# Patient Record
Sex: MALE | Race: BLACK OR AFRICAN AMERICAN | Employment: OTHER | URBAN - METROPOLITAN AREA
[De-identification: names, ages, dates, MRNs, and addresses within clinical notes are randomized per-mention and may not be internally consistent; named-entity substitution may affect disease eponyms.]

---

## 2017-10-20 ASSESSMENT — PROMIS GLOBAL HEALTH SCALE
IN GENERAL, HOW WOULD YOU RATE YOUR MENTAL HEALTH, INCLUDING YOUR MOOD AND YOUR ABILITY TO THINK [ON A SCALE OF 1 (POOR) TO 5 (EXCELLENT)]?: 2
HOW IS THE PROMIS V1.1 BEING ADMINISTERED?: 0
IN GENERAL, HOW WOULD YOU RATE YOUR SATISFACTION WITH YOUR SOCIAL ACTIVITIES AND RELATIONSHIPS [ON A SCALE OF 1 (POOR) TO 5 (EXCELLENT)]?: 4
IN GENERAL, WOULD YOU SAY YOUR HEALTH IS...[ON A SCALE OF 1 (POOR) TO 5 (EXCELLENT)]: 2
TO WHAT EXTENT ARE YOU ABLE TO CARRY OUT YOUR EVERYDAY PHYSICAL ACTIVITIES SUCH AS WALKING, CLIMBING STAIRS, CARRYING GROCERIES, OR MOVING A CHAIR [ON A SCALE OF 1 (NOT AT ALL) TO 5 (COMPLETELY)]?: 3
SUM OF RESPONSES TO QUESTIONS 3, 6, 7, & 8: 16
IN GENERAL, PLEASE RATE HOW WELL YOU CARRY OUT YOUR USUAL SOCIAL ACTIVITIES (INCLUDES ACTIVITIES AT HOME, AT WORK, AND IN YOUR COMMUNITY, AND RESPONSIBILITIES AS A PARENT, CHILD, SPOUSE, EMPLOYEE, FRIEND, ETC) [ON A SCALE OF 1 (POOR) TO 5 (EXCELLENT)]?: 3
SUM OF RESPONSES TO QUESTIONS 2, 4, 5, & 10: 12
WHO IS THE PERSON COMPLETING THE PROMIS V1.1 SURVEY?: 0
IN THE PAST 7 DAYS, HOW WOULD YOU RATE YOUR FATIGUE ON AVERAGE [ON A SCALE FROM 1 (NONE) TO 5 (VERY SEVERE)]?: 3
IN THE PAST 7 DAYS, HOW OFTEN HAVE YOU BEEN BOTHERED BY EMOTIONAL PROBLEMS, SUCH AS FEELING ANXIOUS, DEPRESSED, OR IRRITABLE [ON A SCALE FROM 1 (NEVER) TO 5 (ALWAYS)]?: 3
IN GENERAL, WOULD YOU SAY YOUR QUALITY OF LIFE IS...[ON A SCALE OF 1 (POOR) TO 5 (EXCELLENT)]: 3
IN THE PAST 7 DAYS, HOW WOULD YOU RATE YOUR PAIN ON AVERAGE [ON A SCALE FROM 0 (NO PAIN) TO 10 (WORST IMAGINABLE PAIN)]?: 8
IN GENERAL, HOW WOULD YOU RATE YOUR PHYSICAL HEALTH [ON A SCALE OF 1 (POOR) TO 5 (EXCELLENT)]?: 2

## 2017-10-20 ASSESSMENT — HOOS JR
HOOS JR RAW SCORE: 11
GOING UP OR DOWN STAIRS: 3
RISING FROM SITTING: 2
BENDING TO THE FLOOR TO PICK UP OBJECT: 2
SITTING: 1
LYING IN BED (TURNING OVER, MAINTAINING HIP POSITION): 1
WALKING ON UNEVEN SURFACE: 2

## 2017-10-24 NOTE — CARE COORDINATION
[facility] Pre-surgical Assessment    Assessment done:  Over the phone with Patient  Patient sounds:  alert and oriented X3  Patient abilities:  Able to answer all questions appropriately. Patient DC Plans:  Unsure currently - unable to decide - maybe. [facility] Home with Home Health Care     LABS:       Obtained on: 10/25/17  Hgb     14.3  Hct    42.9  WBC    4.6  Creatinine   0.73  BUN    11  Glucose   99  BMI    26.6    Patient Name:            Davide Servin is a 67 y.o. male     Patient Address:       [address] [place] [address], [address] [place] [address]  Patient Phone:          [phone]  Emergency Contact:  Suhail Russell Mail [phone])    Date of Surgery:  11/7/17 at 7:30 am  Procedure:   LTHA  Surgeon:   [address] Name:   Suhail Russell Mail [phone])  Payor:    [place] Bundle? No  PCP:    Digna Harry MD  Last time seen PCP:  [id number] [facility] Name/Location:  [address]    Living Situation:   Living arrangements:  with family:  spouse       Home:     single family home   Steps:     yes   # of steps to enter home  2   Bedroom Location:  1st floor   Bathroom Location:  1st floor   Social support:  a good social support network   Concerns re: home safety? no    How far can you walk now approximately? 200'  Do you use a cane, walker or other device to help you walk? Yes - \"I use an old cane\"     Risk Analysis:  Surgery Risks/Chronics:  HTN, HLPD, DM2, [place], Hearing Aids, Osteoarthritis  Safety Risks:    Visual Impairment, [place], Bilateral Hearing Aids, Difficulty Ambulating Post Surgery  Pre-Op Clearance received? Appointment later this week  PAT scheduled/completed? Yes tomorrow 10/25/17  Pre-optimization needed? Self-Care, Fall Risk & DME:   Previous falls/injuries in the home?  No   Visual Impairement:  Glasses   Difficulty hearing: Hearing Aid - Left & Right   Smoking: The patient denies any lifetime nicotine/tobacco

## 2017-10-25 ENCOUNTER — HOSPITAL ENCOUNTER (OUTPATIENT)
Dept: PREADMISSION TESTING | Age: 72
Discharge: HOME | End: 2017-10-25
Payer: MEDICARE

## 2017-10-25 ENCOUNTER — HOSPITAL ENCOUNTER (OUTPATIENT)
Dept: GENERAL RADIOLOGY | Age: 72
Discharge: HOME | End: 2017-10-25
Payer: MEDICARE

## 2017-10-25 VITALS
DIASTOLIC BLOOD PRESSURE: 66 MMHG | RESPIRATION RATE: 18 BRPM | WEIGHT: 200.84 LBS | SYSTOLIC BLOOD PRESSURE: 132 MMHG | OXYGEN SATURATION: 99 % | HEIGHT: 73 IN | HEART RATE: 57 BPM | BODY MASS INDEX: 26.62 KG/M2

## 2017-10-25 LAB
ABO/RH: NORMAL
ABSOLUTE EOS #: 0.1 K/UL (ref 0–0.4)
ABSOLUTE IMMATURE GRANULOCYTE: NORMAL K/UL (ref 0–0.3)
ABSOLUTE LYMPH #: 1.2 K/UL (ref 1–4.8)
ABSOLUTE MONO #: 0.3 K/UL (ref 0.2–0.8)
ANION GAP SERPL CALCULATED.3IONS-SCNC: 11 MMOL/L (ref 9–17)
ANTIBODY SCREEN: NEGATIVE
ARM BAND NUMBER: NORMAL
BASOPHILS # BLD: 3 %
BASOPHILS ABSOLUTE: 0.1 K/UL (ref 0–0.2)
BILIRUBIN URINE: NEGATIVE
BUN BLDV-MCNC: 11 MG/DL (ref 8–23)
BUN/CREAT BLD: 15 (ref 9–20)
CALCIUM SERPL-MCNC: 9.7 MG/DL (ref 8.6–10.4)
CHLORIDE BLD-SCNC: 101 MMOL/L (ref 98–107)
CO2: 29 MMOL/L (ref 20–31)
COLOR: YELLOW
COMMENT UA: NORMAL
CREAT SERPL-MCNC: 0.73 MG/DL (ref 0.7–1.2)
DIFFERENTIAL TYPE: NORMAL
EOSINOPHILS RELATIVE PERCENT: 3 %
EXPIRATION DATE: NORMAL
GFR AFRICAN AMERICAN: >60 ML/MIN
GFR NON-AFRICAN AMERICAN: >60 ML/MIN
GFR SERPL CREATININE-BSD FRML MDRD: NORMAL ML/MIN/{1.73_M2}
GFR SERPL CREATININE-BSD FRML MDRD: NORMAL ML/MIN/{1.73_M2}
GLUCOSE BLD-MCNC: 99 MG/DL (ref 70–99)
GLUCOSE URINE: NEGATIVE
HCT VFR BLD CALC: 42.9 % (ref 41–53)
HEMOGLOBIN: 14.3 G/DL (ref 13.5–17.5)
IMMATURE GRANULOCYTES: NORMAL %
KETONES, URINE: NEGATIVE
LEUKOCYTE ESTERASE, URINE: NEGATIVE
LYMPHOCYTES # BLD: 26 %
MCH RBC QN AUTO: 29.9 PG (ref 26–34)
MCHC RBC AUTO-ENTMCNC: 33.4 G/DL (ref 31–37)
MCV RBC AUTO: 89.7 FL (ref 80–100)
MONOCYTES # BLD: 7 %
MRSA, DNA, NASAL: NORMAL
NITRITE, URINE: NEGATIVE
PDW BLD-RTO: 12.5 % (ref 11.5–14.5)
PH UA: 6.5 (ref 5–8)
PLATELET # BLD: 298 K/UL (ref 130–400)
PLATELET ESTIMATE: NORMAL
PMV BLD AUTO: NORMAL FL (ref 6–12)
POTASSIUM SERPL-SCNC: 3.8 MMOL/L (ref 3.7–5.3)
PROTEIN UA: NEGATIVE
RBC # BLD: 4.79 M/UL (ref 4.5–5.9)
RBC # BLD: NORMAL 10*6/UL
SEG NEUTROPHILS: 61 %
SEGMENTED NEUTROPHILS ABSOLUTE COUNT: 2.9 K/UL (ref 1.8–7.7)
SODIUM BLD-SCNC: 141 MMOL/L (ref 135–144)
SPECIFIC GRAVITY UA: 1.01 (ref 1–1.03)
SPECIMEN DESCRIPTION: NORMAL
TURBIDITY: CLEAR
URINE HGB: NEGATIVE
UROBILINOGEN, URINE: NORMAL
WBC # BLD: 4.6 K/UL (ref 3.5–11)
WBC # BLD: NORMAL 10*3/UL

## 2017-10-25 PROCEDURE — 86901 BLOOD TYPING SEROLOGIC RH(D): CPT

## 2017-10-25 PROCEDURE — 81003 URINALYSIS AUTO W/O SCOPE: CPT

## 2017-10-25 PROCEDURE — 86850 RBC ANTIBODY SCREEN: CPT

## 2017-10-25 PROCEDURE — 85025 COMPLETE CBC W/AUTO DIFF WBC: CPT

## 2017-10-25 PROCEDURE — 87641 MR-STAPH DNA AMP PROBE: CPT

## 2017-10-25 PROCEDURE — 71020 XR CHEST STANDARD TWO VW: CPT

## 2017-10-25 PROCEDURE — 86900 BLOOD TYPING SEROLOGIC ABO: CPT

## 2017-10-25 PROCEDURE — 93005 ELECTROCARDIOGRAM TRACING: CPT

## 2017-10-25 PROCEDURE — 80048 BASIC METABOLIC PNL TOTAL CA: CPT

## 2017-10-25 PROCEDURE — 87086 URINE CULTURE/COLONY COUNT: CPT

## 2017-10-25 PROCEDURE — 36415 COLL VENOUS BLD VENIPUNCTURE: CPT

## 2017-10-25 RX ORDER — ATORVASTATIN CALCIUM 40 MG/1
40 TABLET, FILM COATED ORAL NIGHTLY
COMMUNITY

## 2017-10-25 RX ORDER — LISINOPRIL 20 MG/1
20 TABLET ORAL 2 TIMES DAILY
COMMUNITY

## 2017-10-25 RX ORDER — IBUPROFEN 800 MG/1
800 TABLET ORAL 2 TIMES DAILY
Status: ON HOLD | COMMUNITY
End: 2017-11-08 | Stop reason: HOSPADM

## 2017-10-25 RX ORDER — GABAPENTIN 300 MG/1
300 CAPSULE ORAL 2 TIMES DAILY
COMMUNITY

## 2017-10-25 RX ORDER — METOPROLOL SUCCINATE 50 MG/1
50 TABLET, EXTENDED RELEASE ORAL DAILY
COMMUNITY

## 2017-10-25 RX ORDER — TAMSULOSIN HYDROCHLORIDE 0.4 MG/1
0.4 CAPSULE ORAL NIGHTLY
COMMUNITY

## 2017-10-25 RX ORDER — AMLODIPINE BESYLATE 5 MG/1
5 TABLET ORAL DAILY
COMMUNITY

## 2017-10-25 NOTE — PRE-PROCEDURE INSTRUCTIONS
After Your Surgery/Procedure: You will need a friend or family member to drive you home after your procedure. Your  must be 25years of age or older and able to sign off on your discharge instructions. A taxi cab or any other form of public transportation is not acceptable. Your friend or family member must stay at the hospital throughout your procedure. Someone must remain with you for the first 24 hours after your surgery if you receive anesthesia or medication. If you do not have someone to stay with you, your procedure may be cancelled.       If you have any other questions regarding your procedure or the day of surgery, please call [phone]      _________________________  ____________________________  Signature (Patient)              Signature (Provider) & date

## 2017-10-25 NOTE — H&P
tablet Take 40 mg by mouth nightly   Yes Historical Provider, MD   gabapentin (NEURONTIN) 300 MG capsule Take 300 mg by mouth 2 times daily   Yes Historical Provider, MD   amLODIPine (NORVASC) 5 MG tablet Take 5 mg by mouth daily   Yes Historical Provider, MD   lisinopril (PRINIVIL;ZESTRIL) 20 MG tablet Take 20 mg by mouth 2 times daily   Yes Historical Provider, MD   ibuprofen (ADVIL;MOTRIN) 800 MG tablet Take 800 mg by mouth 2 times daily   Yes Historical Provider, MD   Cholecalciferol (VITAMIN D3 PO) Take 1 tablet by mouth 2 times daily   Yes Historical Provider, MD   Insulin NPH Isophane & Regular (HUMULIN 70/30 PEN) (70-30) 100 UNIT per ML injection pen Inject 30 Units into the skin every morning   Yes Historical Provider, MD   Insulin NPH Isophane & Regular (HUMULIN 70/30 PEN) (70-30) 100 UNIT per ML injection pen Inject 22 Units into the skin nightly   Yes Historical Provider, MD   tamsulosin (FLOMAX) 0.4 MG capsule Take 0.4 mg by mouth nightly   Yes Historical Provider, MD   metoprolol succinate (TOPROL XL) 50 MG extended release tablet Take 50 mg by mouth daily   Yes Historical Provider, MD     Allergies  has No Known Allergies. Family History  family history is not on file. Social History   reports that he has never smoked. He has never used smokeless tobacco.   reports that he drinks alcohol. reports that he does not use drugs. ROS:  General Health:  Denies any problems with weight, appetite, or sleep. Skin:  No itching or rashes. No lesions. No easy bruising or bleeding. HEENT: +[place] with bilateral hearing aids  Wears Glasses Denies cephalgia or head injury. No eye or ear pain. No sinusitis, rhinorhea or epistaxis. No frequent sorethroat, dysphagia or hoarseness. No masses, pain, stiffness or injury to the neck. Endocrinology:  +DM checks sugars randomly  Run around 100\".  Denies open sores or nonhealing wounds   Cardio-Respiratory: No hemoptysis, shortness of breath, chest pain,

## 2017-10-26 LAB
CULTURE: NO GROWTH
CULTURE: NORMAL
Lab: NORMAL
SPECIMEN DESCRIPTION: NORMAL
SPECIMEN DESCRIPTION: NORMAL
STATUS: NORMAL

## 2017-10-26 RX ORDER — SCOLOPAMINE TRANSDERMAL SYSTEM 1 MG/1
1 PATCH, EXTENDED RELEASE TRANSDERMAL ONCE
Status: CANCELLED | OUTPATIENT
Start: 2017-11-07

## 2017-10-26 RX ORDER — DEXAMETHASONE SODIUM PHOSPHATE 10 MG/ML
10 INJECTION INTRAMUSCULAR; INTRAVENOUS ONCE
Status: CANCELLED | OUTPATIENT
Start: 2017-11-07

## 2017-11-06 ENCOUNTER — ANESTHESIA EVENT (OUTPATIENT)
Dept: OPERATING ROOM | Age: 72
DRG: 469 | End: 2017-11-06
Payer: MEDICARE

## 2017-11-06 RX ORDER — SODIUM CHLORIDE 9 MG/ML
INJECTION, SOLUTION INTRAVENOUS CONTINUOUS
Status: CANCELLED | OUTPATIENT
Start: 2017-11-07

## 2017-11-07 ENCOUNTER — HOSPITAL ENCOUNTER (INPATIENT)
Age: 72
LOS: 1 days | Discharge: ANOTHER HOSPITAL | DRG: 469 | End: 2017-11-08
Attending: ORTHOPAEDIC SURGERY | Admitting: ORTHOPAEDIC SURGERY
Payer: MEDICARE

## 2017-11-07 ENCOUNTER — APPOINTMENT (OUTPATIENT)
Dept: GENERAL RADIOLOGY | Age: 72
DRG: 469 | End: 2017-11-07
Attending: ORTHOPAEDIC SURGERY
Payer: MEDICARE

## 2017-11-07 ENCOUNTER — ANESTHESIA (OUTPATIENT)
Dept: OPERATING ROOM | Age: 72
DRG: 469 | End: 2017-11-07
Payer: MEDICARE

## 2017-11-07 VITALS — OXYGEN SATURATION: 74 % | DIASTOLIC BLOOD PRESSURE: 54 MMHG | SYSTOLIC BLOOD PRESSURE: 114 MMHG | TEMPERATURE: 96.3 F

## 2017-11-07 DIAGNOSIS — M16.12 PRIMARY OSTEOARTHRITIS OF LEFT HIP: Primary | ICD-10-CM

## 2017-11-07 LAB
BUN BLDV-MCNC: 12 MG/DL (ref 8–23)
CREAT SERPL-MCNC: 0.77 MG/DL (ref 0.7–1.2)
GFR AFRICAN AMERICAN: >60 ML/MIN
GFR NON-AFRICAN AMERICAN: >60 ML/MIN
GFR SERPL CREATININE-BSD FRML MDRD: NORMAL ML/MIN/{1.73_M2}
GFR SERPL CREATININE-BSD FRML MDRD: NORMAL ML/MIN/{1.73_M2}
GLUCOSE BLD-MCNC: 146 MG/DL (ref 75–110)
GLUCOSE BLD-MCNC: 169 MG/DL (ref 75–110)
GLUCOSE BLD-MCNC: 181 MG/DL (ref 75–110)
GLUCOSE BLD-MCNC: 204 MG/DL (ref 75–110)

## 2017-11-07 PROCEDURE — 7100000001 HC PACU RECOVERY - ADDTL 15 MIN: Performed by: ORTHOPAEDIC SURGERY

## 2017-11-07 PROCEDURE — 3700000000 HC ANESTHESIA ATTENDED CARE: Performed by: ORTHOPAEDIC SURGERY

## 2017-11-07 PROCEDURE — 97535 SELF CARE MNGMENT TRAINING: CPT

## 2017-11-07 PROCEDURE — 84520 ASSAY OF UREA NITROGEN: CPT

## 2017-11-07 PROCEDURE — 2580000003 HC RX 258: Performed by: ORTHOPAEDIC SURGERY

## 2017-11-07 PROCEDURE — 3E0R3BZ INTRODUCTION OF ANESTHETIC AGENT INTO SPINAL CANAL, PERCUTANEOUS APPROACH: ICD-10-PCS | Performed by: ORTHOPAEDIC SURGERY

## 2017-11-07 PROCEDURE — 3600000005 HC SURGERY LEVEL 5 BASE: Performed by: ORTHOPAEDIC SURGERY

## 2017-11-07 PROCEDURE — C1776 JOINT DEVICE (IMPLANTABLE): HCPCS | Performed by: ORTHOPAEDIC SURGERY

## 2017-11-07 PROCEDURE — 6360000002 HC RX W HCPCS: Performed by: ANESTHESIOLOGY

## 2017-11-07 PROCEDURE — 2720000010 HC SURG SUPPLY STERILE: Performed by: ORTHOPAEDIC SURGERY

## 2017-11-07 PROCEDURE — 6360000002 HC RX W HCPCS: Performed by: ORTHOPAEDIC SURGERY

## 2017-11-07 PROCEDURE — 82565 ASSAY OF CREATININE: CPT

## 2017-11-07 PROCEDURE — 3700000001 HC ADD 15 MINUTES (ANESTHESIA): Performed by: ORTHOPAEDIC SURGERY

## 2017-11-07 PROCEDURE — 97530 THERAPEUTIC ACTIVITIES: CPT

## 2017-11-07 PROCEDURE — 2580000003 HC RX 258: Performed by: ANESTHESIOLOGY

## 2017-11-07 PROCEDURE — 7100000000 HC PACU RECOVERY - FIRST 15 MIN: Performed by: ORTHOPAEDIC SURGERY

## 2017-11-07 PROCEDURE — 73502 X-RAY EXAM HIP UNI 2-3 VIEWS: CPT

## 2017-11-07 PROCEDURE — 6370000000 HC RX 637 (ALT 250 FOR IP): Performed by: INTERNAL MEDICINE

## 2017-11-07 PROCEDURE — G8979 MOBILITY GOAL STATUS: HCPCS

## 2017-11-07 PROCEDURE — 72170 X-RAY EXAM OF PELVIS: CPT

## 2017-11-07 PROCEDURE — 6370000000 HC RX 637 (ALT 250 FOR IP): Performed by: ORTHOPAEDIC SURGERY

## 2017-11-07 PROCEDURE — 3209999900 FLUORO FOR SURGICAL PROCEDURES

## 2017-11-07 PROCEDURE — 0SRB02Z REPLACEMENT OF LEFT HIP JOINT WITH METAL ON POLYETHYLENE SYNTHETIC SUBSTITUTE, OPEN APPROACH: ICD-10-PCS | Performed by: ORTHOPAEDIC SURGERY

## 2017-11-07 PROCEDURE — 36415 COLL VENOUS BLD VENIPUNCTURE: CPT

## 2017-11-07 PROCEDURE — A4364 ADHESIVE, LIQUID OR EQUAL: HCPCS | Performed by: ORTHOPAEDIC SURGERY

## 2017-11-07 PROCEDURE — G8987 SELF CARE CURRENT STATUS: HCPCS

## 2017-11-07 PROCEDURE — C1713 ANCHOR/SCREW BN/BN,TIS/BN: HCPCS | Performed by: ORTHOPAEDIC SURGERY

## 2017-11-07 PROCEDURE — 3600000015 HC SURGERY LEVEL 5 ADDTL 15MIN: Performed by: ORTHOPAEDIC SURGERY

## 2017-11-07 PROCEDURE — 1200000000 HC SEMI PRIVATE

## 2017-11-07 PROCEDURE — 2500000003 HC RX 250 WO HCPCS: Performed by: NURSE ANESTHETIST, CERTIFIED REGISTERED

## 2017-11-07 PROCEDURE — 97162 PT EVAL MOD COMPLEX 30 MIN: CPT

## 2017-11-07 PROCEDURE — 2500000003 HC RX 250 WO HCPCS: Performed by: ORTHOPAEDIC SURGERY

## 2017-11-07 PROCEDURE — 6360000002 HC RX W HCPCS: Performed by: NURSE ANESTHETIST, CERTIFIED REGISTERED

## 2017-11-07 PROCEDURE — 3E0T3BZ INTRODUCTION OF ANESTHETIC AGENT INTO PERIPHERAL NERVES AND PLEXI, PERCUTANEOUS APPROACH: ICD-10-PCS | Performed by: ORTHOPAEDIC SURGERY

## 2017-11-07 PROCEDURE — 82947 ASSAY GLUCOSE BLOOD QUANT: CPT

## 2017-11-07 PROCEDURE — 97166 OT EVAL MOD COMPLEX 45 MIN: CPT

## 2017-11-07 PROCEDURE — G8978 MOBILITY CURRENT STATUS: HCPCS

## 2017-11-07 PROCEDURE — 2580000003 HC RX 258: Performed by: NURSE ANESTHETIST, CERTIFIED REGISTERED

## 2017-11-07 DEVICE — BONE SCREW 6.5X25 SELF-TAP: Type: IMPLANTABLE DEVICE | Site: HIP | Status: FUNCTIONAL

## 2017-11-07 DEVICE — LINER ACET SZ F DIA40MM NEUT HIP XLPE ARCOMXL G7: Type: IMPLANTABLE DEVICE | Site: HIP | Status: FUNCTIONAL

## 2017-11-07 DEVICE — STEM FEM SZ 13 L111MM NK L39.6MM 133DEG HI OFFSET HIP TI: Type: IMPLANTABLE DEVICE | Site: HIP | Status: FUNCTIONAL

## 2017-11-07 DEVICE — IMPLANTABLE DEVICE
Type: IMPLANTABLE DEVICE | Site: HIP | Status: FUNCTIONAL
Brand: BIOMET®

## 2017-11-07 DEVICE — G7 FINNED 4 HOLE SHELL 56F: Type: IMPLANTABLE DEVICE | Site: HIP | Status: FUNCTIONAL

## 2017-11-07 RX ORDER — PROPOFOL 10 MG/ML
INJECTION, EMULSION INTRAVENOUS PRN
Status: DISCONTINUED | OUTPATIENT
Start: 2017-11-07 | End: 2017-11-07 | Stop reason: SDUPTHER

## 2017-11-07 RX ORDER — ONDANSETRON 2 MG/ML
4 INJECTION INTRAMUSCULAR; INTRAVENOUS EVERY 6 HOURS PRN
Status: DISCONTINUED | OUTPATIENT
Start: 2017-11-07 | End: 2017-11-08 | Stop reason: HOSPADM

## 2017-11-07 RX ORDER — SODIUM CHLORIDE 9 MG/ML
INJECTION, SOLUTION INTRAVENOUS CONTINUOUS
Status: DISCONTINUED | OUTPATIENT
Start: 2017-11-07 | End: 2017-11-08 | Stop reason: HOSPADM

## 2017-11-07 RX ORDER — FENTANYL CITRATE 50 UG/ML
25 INJECTION, SOLUTION INTRAMUSCULAR; INTRAVENOUS EVERY 5 MIN PRN
Status: DISCONTINUED | OUTPATIENT
Start: 2017-11-07 | End: 2017-11-07 | Stop reason: HOSPADM

## 2017-11-07 RX ORDER — LISINOPRIL 20 MG/1
20 TABLET ORAL 2 TIMES DAILY
Status: DISCONTINUED | OUTPATIENT
Start: 2017-11-07 | End: 2017-11-08 | Stop reason: HOSPADM

## 2017-11-07 RX ORDER — ROPIVACAINE HYDROCHLORIDE 2 MG/ML
INJECTION, SOLUTION EPIDURAL; INFILTRATION; PERINEURAL PRN
Status: DISCONTINUED | OUTPATIENT
Start: 2017-11-07 | End: 2017-11-07 | Stop reason: SDUPTHER

## 2017-11-07 RX ORDER — DOCUSATE SODIUM 100 MG/1
100 CAPSULE, LIQUID FILLED ORAL 2 TIMES DAILY
Status: DISCONTINUED | OUTPATIENT
Start: 2017-11-07 | End: 2017-11-08 | Stop reason: HOSPADM

## 2017-11-07 RX ORDER — ACETAMINOPHEN 325 MG/1
650 TABLET ORAL EVERY 4 HOURS PRN
Status: DISCONTINUED | OUTPATIENT
Start: 2017-11-07 | End: 2017-11-08 | Stop reason: HOSPADM

## 2017-11-07 RX ORDER — HYDROMORPHONE HCL 110MG/55ML
0.25 PATIENT CONTROLLED ANALGESIA SYRINGE INTRAVENOUS EVERY 5 MIN PRN
Status: DISCONTINUED | OUTPATIENT
Start: 2017-11-07 | End: 2017-11-07 | Stop reason: HOSPADM

## 2017-11-07 RX ORDER — LORAZEPAM 0.5 MG/1
0.5 TABLET ORAL EVERY 4 HOURS PRN
Status: DISCONTINUED | OUTPATIENT
Start: 2017-11-07 | End: 2017-11-08 | Stop reason: HOSPADM

## 2017-11-07 RX ORDER — DEXAMETHASONE SODIUM PHOSPHATE 10 MG/ML
INJECTION INTRAMUSCULAR; INTRAVENOUS PRN
Status: DISCONTINUED | OUTPATIENT
Start: 2017-11-07 | End: 2017-11-07 | Stop reason: SDUPTHER

## 2017-11-07 RX ORDER — GABAPENTIN 300 MG/1
300 CAPSULE ORAL 2 TIMES DAILY
Status: DISCONTINUED | OUTPATIENT
Start: 2017-11-07 | End: 2017-11-08

## 2017-11-07 RX ORDER — CEFAZOLIN SODIUM 500 MG/2.2ML
INJECTION, POWDER, FOR SOLUTION INTRAMUSCULAR; INTRAVENOUS PRN
Status: DISCONTINUED | OUTPATIENT
Start: 2017-11-07 | End: 2017-11-07 | Stop reason: HOSPADM

## 2017-11-07 RX ORDER — TAMSULOSIN HYDROCHLORIDE 0.4 MG/1
0.4 CAPSULE ORAL NIGHTLY
Status: DISCONTINUED | OUTPATIENT
Start: 2017-11-07 | End: 2017-11-08 | Stop reason: HOSPADM

## 2017-11-07 RX ORDER — ROCURONIUM BROMIDE 10 MG/ML
INJECTION, SOLUTION INTRAVENOUS PRN
Status: DISCONTINUED | OUTPATIENT
Start: 2017-11-07 | End: 2017-11-07 | Stop reason: SDUPTHER

## 2017-11-07 RX ORDER — SODIUM CHLORIDE 0.9 % (FLUSH) 0.9 %
10 SYRINGE (ML) INJECTION EVERY 12 HOURS SCHEDULED
Status: DISCONTINUED | OUTPATIENT
Start: 2017-11-07 | End: 2017-11-07 | Stop reason: HOSPADM

## 2017-11-07 RX ORDER — DEXTROSE MONOHYDRATE 25 G/50ML
12.5 INJECTION, SOLUTION INTRAVENOUS PRN
Status: DISCONTINUED | OUTPATIENT
Start: 2017-11-07 | End: 2017-11-08 | Stop reason: HOSPADM

## 2017-11-07 RX ORDER — GLYCOPYRROLATE 0.2 MG/ML
INJECTION INTRAMUSCULAR; INTRAVENOUS PRN
Status: DISCONTINUED | OUTPATIENT
Start: 2017-11-07 | End: 2017-11-07 | Stop reason: SDUPTHER

## 2017-11-07 RX ORDER — SODIUM CHLORIDE 0.9 % (FLUSH) 0.9 %
10 SYRINGE (ML) INJECTION EVERY 12 HOURS SCHEDULED
Status: DISCONTINUED | OUTPATIENT
Start: 2017-11-07 | End: 2017-11-08 | Stop reason: HOSPADM

## 2017-11-07 RX ORDER — SODIUM CHLORIDE, SODIUM LACTATE, POTASSIUM CHLORIDE, CALCIUM CHLORIDE 600; 310; 30; 20 MG/100ML; MG/100ML; MG/100ML; MG/100ML
INJECTION, SOLUTION INTRAVENOUS CONTINUOUS
Status: DISCONTINUED | OUTPATIENT
Start: 2017-11-08 | End: 2017-11-07

## 2017-11-07 RX ORDER — OXYCODONE HYDROCHLORIDE 5 MG/1
5 TABLET ORAL EVERY 4 HOURS PRN
Status: DISCONTINUED | OUTPATIENT
Start: 2017-11-07 | End: 2017-11-08

## 2017-11-07 RX ORDER — HYDROXYZINE HYDROCHLORIDE 25 MG/1
25 TABLET, FILM COATED ORAL 3 TIMES DAILY PRN
Status: DISCONTINUED | OUTPATIENT
Start: 2017-11-07 | End: 2017-11-08 | Stop reason: HOSPADM

## 2017-11-07 RX ORDER — AMLODIPINE BESYLATE 5 MG/1
5 TABLET ORAL DAILY
Status: DISCONTINUED | OUTPATIENT
Start: 2017-11-07 | End: 2017-11-08 | Stop reason: HOSPADM

## 2017-11-07 RX ORDER — ATORVASTATIN CALCIUM 40 MG/1
40 TABLET, FILM COATED ORAL NIGHTLY
Status: DISCONTINUED | OUTPATIENT
Start: 2017-11-07 | End: 2017-11-08 | Stop reason: HOSPADM

## 2017-11-07 RX ORDER — HYDRALAZINE HYDROCHLORIDE 20 MG/ML
5 INJECTION INTRAMUSCULAR; INTRAVENOUS EVERY 10 MIN PRN
Status: DISCONTINUED | OUTPATIENT
Start: 2017-11-07 | End: 2017-11-07 | Stop reason: HOSPADM

## 2017-11-07 RX ORDER — SODIUM CHLORIDE 0.9 % (FLUSH) 0.9 %
10 SYRINGE (ML) INJECTION PRN
Status: DISCONTINUED | OUTPATIENT
Start: 2017-11-07 | End: 2017-11-08 | Stop reason: HOSPADM

## 2017-11-07 RX ORDER — DEXTROSE MONOHYDRATE 50 MG/ML
100 INJECTION, SOLUTION INTRAVENOUS PRN
Status: DISCONTINUED | OUTPATIENT
Start: 2017-11-07 | End: 2017-11-08 | Stop reason: HOSPADM

## 2017-11-07 RX ORDER — BACITRACIN 50000 [USP'U]/1
INJECTION, POWDER, LYOPHILIZED, FOR SOLUTION INTRAMUSCULAR PRN
Status: DISCONTINUED | OUTPATIENT
Start: 2017-11-07 | End: 2017-11-07 | Stop reason: HOSPADM

## 2017-11-07 RX ORDER — SODIUM CHLORIDE 0.9 % (FLUSH) 0.9 %
10 SYRINGE (ML) INJECTION PRN
Status: DISCONTINUED | OUTPATIENT
Start: 2017-11-07 | End: 2017-11-07 | Stop reason: HOSPADM

## 2017-11-07 RX ORDER — LABETALOL HYDROCHLORIDE 5 MG/ML
5 INJECTION, SOLUTION INTRAVENOUS EVERY 10 MIN PRN
Status: DISCONTINUED | OUTPATIENT
Start: 2017-11-07 | End: 2017-11-07 | Stop reason: HOSPADM

## 2017-11-07 RX ORDER — FENTANYL CITRATE 50 UG/ML
50 INJECTION, SOLUTION INTRAMUSCULAR; INTRAVENOUS EVERY 5 MIN PRN
Status: DISCONTINUED | OUTPATIENT
Start: 2017-11-07 | End: 2017-11-07 | Stop reason: HOSPADM

## 2017-11-07 RX ORDER — LIDOCAINE HYDROCHLORIDE 10 MG/ML
1 INJECTION, SOLUTION EPIDURAL; INFILTRATION; INTRACAUDAL; PERINEURAL
Status: DISCONTINUED | OUTPATIENT
Start: 2017-11-08 | End: 2017-11-07 | Stop reason: HOSPADM

## 2017-11-07 RX ORDER — ONDANSETRON 2 MG/ML
4 INJECTION INTRAMUSCULAR; INTRAVENOUS
Status: DISCONTINUED | OUTPATIENT
Start: 2017-11-07 | End: 2017-11-07 | Stop reason: HOSPADM

## 2017-11-07 RX ORDER — METOPROLOL SUCCINATE 50 MG/1
50 TABLET, EXTENDED RELEASE ORAL DAILY
Status: DISCONTINUED | OUTPATIENT
Start: 2017-11-07 | End: 2017-11-08 | Stop reason: HOSPADM

## 2017-11-07 RX ORDER — SCOLOPAMINE TRANSDERMAL SYSTEM 1 MG/1
1 PATCH, EXTENDED RELEASE TRANSDERMAL ONCE
Status: DISCONTINUED | OUTPATIENT
Start: 2017-11-07 | End: 2017-11-08

## 2017-11-07 RX ORDER — FENTANYL CITRATE 50 UG/ML
INJECTION, SOLUTION INTRAMUSCULAR; INTRAVENOUS PRN
Status: DISCONTINUED | OUTPATIENT
Start: 2017-11-07 | End: 2017-11-07 | Stop reason: SDUPTHER

## 2017-11-07 RX ORDER — MIDAZOLAM HYDROCHLORIDE 1 MG/ML
INJECTION INTRAMUSCULAR; INTRAVENOUS PRN
Status: DISCONTINUED | OUTPATIENT
Start: 2017-11-07 | End: 2017-11-07 | Stop reason: SDUPTHER

## 2017-11-07 RX ORDER — OMEGA-3S/DHA/EPA/FISH OIL/D3 300MG-1000
400 CAPSULE ORAL 2 TIMES DAILY
Status: DISCONTINUED | OUTPATIENT
Start: 2017-11-07 | End: 2017-11-07 | Stop reason: RX

## 2017-11-07 RX ORDER — OXYCODONE HYDROCHLORIDE 5 MG/1
10 TABLET ORAL EVERY 4 HOURS PRN
Status: DISCONTINUED | OUTPATIENT
Start: 2017-11-07 | End: 2017-11-08

## 2017-11-07 RX ORDER — DEXAMETHASONE SODIUM PHOSPHATE 10 MG/ML
10 INJECTION INTRAMUSCULAR; INTRAVENOUS ONCE
Status: DISCONTINUED | OUTPATIENT
Start: 2017-11-07 | End: 2017-11-07 | Stop reason: HOSPADM

## 2017-11-07 RX ORDER — HYDROMORPHONE HCL 110MG/55ML
0.5 PATIENT CONTROLLED ANALGESIA SYRINGE INTRAVENOUS EVERY 5 MIN PRN
Status: DISCONTINUED | OUTPATIENT
Start: 2017-11-07 | End: 2017-11-07 | Stop reason: HOSPADM

## 2017-11-07 RX ORDER — NICOTINE POLACRILEX 4 MG
15 LOZENGE BUCCAL PRN
Status: DISCONTINUED | OUTPATIENT
Start: 2017-11-07 | End: 2017-11-08 | Stop reason: HOSPADM

## 2017-11-07 RX ADMIN — PROPOFOL 30 MG: 10 INJECTION, EMULSION INTRAVENOUS at 08:40

## 2017-11-07 RX ADMIN — PHENYLEPHRINE HYDROCHLORIDE 100 MCG: 10 INJECTION INTRAVENOUS at 08:18

## 2017-11-07 RX ADMIN — FENTANYL CITRATE 75 MCG: 50 INJECTION, SOLUTION INTRAMUSCULAR; INTRAVENOUS at 07:35

## 2017-11-07 RX ADMIN — SODIUM CHLORIDE, POTASSIUM CHLORIDE, SODIUM LACTATE AND CALCIUM CHLORIDE: 600; 310; 30; 20 INJECTION, SOLUTION INTRAVENOUS at 07:24

## 2017-11-07 RX ADMIN — GABAPENTIN 300 MG: 300 CAPSULE ORAL at 17:30

## 2017-11-07 RX ADMIN — HYDROMORPHONE HYDROCHLORIDE 0.5 MG: 1 INJECTION, SOLUTION INTRAMUSCULAR; INTRAVENOUS; SUBCUTANEOUS at 08:46

## 2017-11-07 RX ADMIN — METOPROLOL SUCCINATE 50 MG: 50 TABLET, FILM COATED, EXTENDED RELEASE ORAL at 18:06

## 2017-11-07 RX ADMIN — ROPIVACAINE HYDROCHLORIDE 40 ML: 2 INJECTION, SOLUTION EPIDURAL; INFILTRATION at 11:00

## 2017-11-07 RX ADMIN — CEFAZOLIN SODIUM 2 G: 2 SOLUTION INTRAVENOUS at 07:24

## 2017-11-07 RX ADMIN — LISINOPRIL 20 MG: 20 TABLET ORAL at 15:48

## 2017-11-07 RX ADMIN — SODIUM CHLORIDE: 9 INJECTION, SOLUTION INTRAVENOUS at 15:23

## 2017-11-07 RX ADMIN — AMLODIPINE BESYLATE 5 MG: 5 TABLET ORAL at 15:48

## 2017-11-07 RX ADMIN — HYDROMORPHONE HYDROCHLORIDE 0.5 MG: 1 INJECTION, SOLUTION INTRAMUSCULAR; INTRAVENOUS; SUBCUTANEOUS at 08:37

## 2017-11-07 RX ADMIN — SODIUM CHLORIDE, POTASSIUM CHLORIDE, SODIUM LACTATE AND CALCIUM CHLORIDE: 600; 310; 30; 20 INJECTION, SOLUTION INTRAVENOUS at 09:31

## 2017-11-07 RX ADMIN — CEFAZOLIN SODIUM 2 G: 2 SOLUTION INTRAVENOUS at 15:48

## 2017-11-07 RX ADMIN — PHENYLEPHRINE HYDROCHLORIDE 100 MCG: 10 INJECTION INTRAVENOUS at 07:52

## 2017-11-07 RX ADMIN — INSULIN LISPRO 22 UNITS: 100 INJECTION, SUSPENSION SUBCUTANEOUS at 17:31

## 2017-11-07 RX ADMIN — OXYCODONE HYDROCHLORIDE 5 MG: 5 TABLET ORAL at 23:48

## 2017-11-07 RX ADMIN — TRANEXAMIC ACID 1.36 G: 100 INJECTION, SOLUTION INTRAVENOUS at 08:22

## 2017-11-07 RX ADMIN — FENTANYL CITRATE 125 MCG: 50 INJECTION, SOLUTION INTRAMUSCULAR; INTRAVENOUS at 07:46

## 2017-11-07 RX ADMIN — PROPOFOL 170 MG: 10 INJECTION, EMULSION INTRAVENOUS at 07:45

## 2017-11-07 RX ADMIN — SODIUM CHLORIDE, POTASSIUM CHLORIDE, SODIUM LACTATE AND CALCIUM CHLORIDE: 600; 310; 30; 20 INJECTION, SOLUTION INTRAVENOUS at 06:49

## 2017-11-07 RX ADMIN — CEFAZOLIN SODIUM 2 G: 2 SOLUTION INTRAVENOUS at 23:48

## 2017-11-07 RX ADMIN — ATORVASTATIN CALCIUM 40 MG: 40 TABLET, FILM COATED ORAL at 21:03

## 2017-11-07 RX ADMIN — LISINOPRIL 20 MG: 20 TABLET ORAL at 21:09

## 2017-11-07 RX ADMIN — METFORMIN HYDROCHLORIDE 500 MG: 500 TABLET, FILM COATED ORAL at 17:30

## 2017-11-07 RX ADMIN — APIXABAN 2.5 MG: 2.5 TABLET, FILM COATED ORAL at 21:03

## 2017-11-07 RX ADMIN — GABAPENTIN 300 MG: 300 CAPSULE ORAL at 21:03

## 2017-11-07 RX ADMIN — DOCUSATE SODIUM 100 MG: 100 CAPSULE, LIQUID FILLED ORAL at 21:03

## 2017-11-07 RX ADMIN — PHENYLEPHRINE HYDROCHLORIDE 100 MCG: 10 INJECTION INTRAVENOUS at 07:43

## 2017-11-07 RX ADMIN — MIDAZOLAM HYDROCHLORIDE 2 MG: 1 INJECTION, SOLUTION INTRAMUSCULAR; INTRAVENOUS at 07:25

## 2017-11-07 RX ADMIN — PHENYLEPHRINE HYDROCHLORIDE 100 MCG: 10 INJECTION INTRAVENOUS at 08:20

## 2017-11-07 RX ADMIN — TAMSULOSIN HYDROCHLORIDE 0.4 MG: 0.4 CAPSULE ORAL at 21:03

## 2017-11-07 RX ADMIN — NEOSTIGMINE METHYLSULFATE 2.5 MG: 1 INJECTION INTRAMUSCULAR; INTRAVENOUS; SUBCUTANEOUS at 11:04

## 2017-11-07 RX ADMIN — Medication 0.4 MG: at 11:04

## 2017-11-07 RX ADMIN — DEXAMETHASONE SODIUM PHOSPHATE 10 MG: 10 INJECTION INTRAMUSCULAR; INTRAVENOUS at 08:15

## 2017-11-07 RX ADMIN — PHENYLEPHRINE HYDROCHLORIDE 100 MCG: 10 INJECTION INTRAVENOUS at 07:50

## 2017-11-07 RX ADMIN — ROCURONIUM BROMIDE 50 MG: 10 INJECTION, SOLUTION INTRAVENOUS at 07:46

## 2017-11-07 RX ADMIN — SODIUM CHLORIDE: 9 INJECTION, SOLUTION INTRAVENOUS at 23:48

## 2017-11-07 ASSESSMENT — PAIN SCALES - GENERAL
PAINLEVEL_OUTOF10: 0
PAINLEVEL_OUTOF10: 6
PAINLEVEL_OUTOF10: 5
PAINLEVEL_OUTOF10: 0

## 2017-11-07 ASSESSMENT — PAIN DESCRIPTION - LOCATION: LOCATION: HIP

## 2017-11-07 ASSESSMENT — PAIN - FUNCTIONAL ASSESSMENT: PAIN_FUNCTIONAL_ASSESSMENT: 0-10

## 2017-11-07 ASSESSMENT — PAIN DESCRIPTION - ORIENTATION: ORIENTATION: LEFT

## 2017-11-07 ASSESSMENT — PAIN DESCRIPTION - PAIN TYPE
TYPE: SURGICAL PAIN
TYPE: SURGICAL PAIN

## 2017-11-07 ASSESSMENT — PAIN DESCRIPTION - DESCRIPTORS: DESCRIPTORS: ACHING;DISCOMFORT

## 2017-11-07 NOTE — ANESTHESIA POSTPROCEDURE EVALUATION
Department of Anesthesiology  Postprocedure Note    Patient: Dontae Yeh  MRN: [id number]  YOB: 1945  Date of evaluation: 11/7/2017  Time:  2:22 PM     Procedure Summary     Date:  11/07/17 Room / Location:  [facility] OR 01 / [facility] OR    Anesthesia Start:  1612 Anesthesia Stop:  1120    Procedure:  LEFT HIP TOTAL ARTHROPLASTY ANTERIOR APPROACH - BIOMET (Teo Mita) MARIA LUISA (Left Hip) Diagnosis:  (OA LEFT HIP)    Surgeon:  Jignesh Frausto MD Responsible Provider:  Caitlin Martini MD    Anesthesia Type:  spinal, general, regional ASA Status:  3          Anesthesia Type: spinal, general, regional    Deni Phase I: Deni Score: 10    Deni Phase II:      Last vitals: Reviewed and per EMR flowsheets.        Anesthesia Post Evaluation    Patient location during evaluation: PACU  Patient participation: complete - patient participated  Level of consciousness: awake and alert  Airway patency: patent  Nausea & Vomiting: no nausea and no vomiting  Complications: no  Cardiovascular status: blood pressure returned to baseline  Respiratory status: acceptable  Hydration status: euvolemic

## 2017-11-07 NOTE — CARE COORDINATION
DME EQUIPMENT Planning Note:    Admission Date:   11/7/2017 Kenzie Agosto is a 67 y.o.  male    Admitted for:  Osteoarthritis of left hip, unspecified osteoarthritis type [M16.12]    Order for DME: Tiffani Zepeda  Faxed to:  [facility]  Date/Time:  11/7/17 at 2:39 PM    DME:    Dar nuñez    Electronically signed by:     Mp Lobo, MSN, RN, CNL on 11/7/17 at 2:39 PM  Orthopedic Nurse Navigator - [facility]  Phone: [phone] / Fax: [phone]

## 2017-11-07 NOTE — CARE COORDINATION
[facility] [address] [facility], [address] [facility] 2  DISCHARGE PLANS    Patient:     Jeanette Cunha     Admitted for:    Left Total Hip Replacement    Admission Date:   11/7/2017     Room #:    2021-01    Anticipated Discharge Date: 11/8/17    Discharge Plan:          [address] are now discharging to a skilled nursing facility. Appointments:    Your care will continue at home with: [facility] you have chosen:  Name:  Heike Calzada  Address: [facility] [address], [place], [place] [address]  Phone:  [phone]  Fax:  [phone]      Your Follow-Up Appointment with your surgeon is:  Surgeon:  Dr. Clay Noe  Appointment:  November 22, 2017 at 10:40 a.m. Address: The [address] [place] [address] [address]     [address] [facility], [address] [place] [address]  Phone:   [phone]  Fax:   [phone]    Equipment: You have stated that you do need a walker. A walker has been ordered for you from [facility]. The walker will be delivered to your hospital room. Anticoagulation Medication:  The anticoagulation medication your doctor has chosen   for you, to prevent blood clots, is Eliquis, 2.5 mg, twice a day. Make sure to take this medication as directed in order to   prevent blood clots from forming. Please call for questions:    Avi Boxer, MSN, RN, [facility]    Phone: [phone] / Fax: [phone]    · Congratulations on your new joint! It has been my pleasure to take part in your recovery process. Please anticipate a couple phone calls from me, after you discharge, to monitor your progress and answer any of your questions. Feel free to call me at any time for any reason.  Van Herrera [facility] [phone] (Orthopedic Nurse Navigator)     · [place]ase on Life  Use this recovery period from receiving a new joint replacement as an opportunity to improve your overall health. Increase your activity level, improve your eating habits. If you are a smoker, seriously consider stopping this habit all together. It will slow your healing process and decrease your ability to do your physical activity. The same principle applies if you drink alcohol in excess. This also slows your ability to heal.       · Ice and elevation  Ice and elevate your leg AT LEAST 4 times a day for 20 minutes each time! This will reduce swelling which will in turn reduce pain. · Compression  Compression Socks go on your legs before you get out of bed in the morning and should be removed just before you go to bed at night for the first four weeks after discharge. · Activity  During the day, get up at least once every hour. Go get a drink of water, go to the bathroom, go look out the window - movement is essential and key to recovery!!!    · Physical Therapy  Do your physical therapy exercises twice a day - with a therapist, a , or on your own. Movement is key!!!    · Rest:  Balance is key - in other words, do not sit for too long, but also do not overdo your activity level either. Sitting for too long will cause stiffness, but overdoing activity may cause you to become tired and weak and increases your chances of falling, as well as increase your swelling and pain. Again, BALANCE is the key! · Avoid Constipation  Narcotic pain medications slow bowel motility and can cause constipation. To prevent you from becoming constipated, be sure to drink lots of fluids, eat fiber-rich foods, take a daily stool softener and take a laxative, as needed. · You Really Are What You Eat: Wound Healing:  Make sure to eat lots of protein, vitamin C and iron-rich foods to help your body with the healing process.   (If you have other medical conditions that require you to limit your protein, then you must find the right balance of protein to meet your healing needs balanced with your other medical conditions.) · Avoid eating lots of sugary foods. It slows down wound healing. Bacteria loves sugar and we don't want to give bacteria a fighting chance! Incision Care:   Keep dressing intact until seen and removed by surgeon.  If dressing becomes saturated, then call the surgeon.  If dressing falls off, then call surgeon.  You may shower with the dressing in place.  If you have a NILSON dressing, disconnect the battery pack and         reconnect the battery pack after your shower.  Ice the surgical site four times a day, for twenty minutes. Normal Conditions:   Swelling in the operative leg is normal: this should reduce over time. Ice and elevate your leg 4 x a day for 20 minutes each time, to reduce the swelling which will also reduce your pain.  Compression Socks go on in the a.m. and off in the p.m.  Some post-operative pain is normal and will get as time goes on.  Some constipation is normal due to the narcotic pain medications you are taking. To relieve constipation, drink lots of fluids, eat foods high in fiber, walk more frequently, and take an over the counter laxative as needed.  Slight warmth of operative leg is normal the first few weeks.  Fatigue and moderate pain after therapy is normal the first few weeks.  Numbness near the incision site is normal and will improve with time. When to call the Surgeon:   Increased redness, warmth, drainage, swelling or odor from incision site.  Temperature above 101 degrees, not relieved by Tylenol after two doses.  Pain worsening or not well-controlled by prescribed medications.  Calf tenderness, swelling, or redness not relieved by elevation and ice.  Shortness of breath or chest pain.  Any incision or surgical-related concerns.  Call surgeon with concerns PRIOR TO coming to the ER.     Foods to help prevent constipation while on narcotic pain medications:    FRUITS AND VEGETABLES HIGH IN FIBER:    Avocado  10 grams of fiber in 1 cup    Raspberries  8 grams in 1 cup     Blackberries   7.6 grams of fiber per cup    Artichoke   6.9 grams of fiber in 1 artichoke    Pear    5.5 grams of fiber in 1 pear    Apples   4.4 grams of fiber in 1 apple    Beets   3.8 grams of fiber in 1 cup    Sweet Potato  3.8 grams of fiber in 1 potato    Blueberries  3.6 grams of fiber in 1 cup    Carrots  3.4 grams of fiber in 1 cup     Brussel Sprouts 3.3 grams of fiber in 1 cup    Banana  3.1 grams of fiber in 1 banana       Strawberries  3 grams of fiber in 1 cup     Broccoli  2.4 grams of fiber in 1 cup  BEANS, LEGUMES, GRAINS HIGH IN FIBER:    Oats   16.5 grams of fiber in 1 cup raw oats     Split Peas  16.3 gramsof fiber in 1 cup, cooked    Lentils   15.6 grams of fiber in 1 cup, cooked    Chickpeas  12.5 grams of fiber in 1 cup, cooked    Kidney Beans  11.3 grams of fiber in 1 cup, cooked  OTHER HIGH FIBER TREATS:    Almonds  3.5 grams of fiber per ounce    Dark Chocolate  3.1 grams of fiber per ounce    Foods high in protein to speed wound healing:    Chicken, 3 oz.  28 grams    [place] ([facility]), 3 oz.   25 grams    Pork, 3 oz.  22 grams    [place], 3 oz.  22 grams    Tuna, 3 oz.  22 grams    Garza Beans, 1 cup 22 grams    3 Eggs, large  18 grams

## 2017-11-07 NOTE — H&P (VIEW-ONLY)
PAT History and Physical    Pt Name: Erica Melara  MRN: [id number]  YOB: 1945  Date of evaluation: 10/25/2017  Primary Care Physician: Tanner Nicolas MD    SUBJECTIVE:   History of Chief Complaint: This is Erica Melara a 67 y.o. male presents to [facility] for upcoming  Left total hip arthroplasty anterior approach by Dr Nancy Marin for Left hip OA scheduled on 11/7/17 @0730. The patient c/o aching left hip pain rated 5/10 the past year that has progressively worsened since September 2017. Aggravated by walking short distances, standing prolonged periods and ascending/descending stairs. Seen by PCP and sent for xray that showed \"bad arthritis\"  Referred to Dr Nancy Marin and agrees to surgical intervention. Takes Ibuprofen 800mg as directed which help to control his pain   Gets relief from resting or sitting  The patient denies  fever, chills, cough, SOB with or w/o activity, chest pain, palpitations, dizziness, lightheadedness or night sweats     Patient is a vague historian. Denies any cardiorespiratory symptoms  EKG done in [facility] today showed atrial fibrillation with slow ventricular response. T wave abnormality, considered inferior ischemia or digitalis effect and considered Abnormal  EKG. I showed the EKG to Anesthesiologist, Dr Connor Read who requested a Cardiac Clearance PAT Nurse Nelva Lesches informed. Past Medical History    has a past medical history of Arthritis; Diabetes ([address] Hearing aid worn; [place] (hard of hearing); Hyperlipidemia; and Hypertension. Past Surgical History   has a past surgical history that includes Neck surgery; back surgery; and Cholecystectomy. Medications  Prior to Admission medications    Medication Sig Start Date End Date Taking?  Authorizing Provider   aspirin 81 MG tablet Take 81 mg by mouth daily   Yes Historical Provider, MD   metFORMIN (GLUCOPHAGE) 500 MG tablet Take 500 mg by mouth 2 times daily (with meals)   Yes Historical Provider, MD   atorvastatin (LIPITOR) 40 MG Hypertension. PLANS:   1.   Left total hip arthroplasty     Giancarlo Porras, ANP-BC  Electronically signed 10/25/2017 at 12:34 PM

## 2017-11-07 NOTE — ANESTHESIA PROCEDURE NOTES
Spinal Block    Patient location during procedure: OR  Start time: 11/7/2017 7:28 AM  End time: 11/7/2017 7:44 AM  Reason for block: primary anesthetic  Staffing  Anesthesiologist: Luciano Galarza  Resident/CRNA: Jatinder WHITFIELD  Performed: anesthesiologist and resident/CRNA   Preanesthetic Checklist  Completed: patient identified, site marked, surgical consent, pre-op evaluation, timeout performed, IV checked, risks and benefits discussed, monitors and equipment checked, anesthesia consent given, oxygen available and patient being monitored  Spinal Block  Patient position: sitting  Prep: Betadine and site prepped and draped  Patient monitoring: continuous pulse ox, continuous capnometry and frequent blood pressure checks  Approach: midline  Location: L4/L5 (L2-3, and L3-4 also attempted)  Procedures: paresthesia technique  Provider prep: mask and sterile gloves  Local infiltration: lidocaine  Agent: bupivacaine  Adjuvant: fentanyl (25 mcg Fentanyl)  Needle  Needle type: Pencan   Needle gauge: 25 G  Needle length: 4 in  Assessment  Swirl obtained: No  Attempts: 3+  Hemodynamics: stable  Additional Notes  Unable to perform spinal due to scarring from previous back surgery

## 2017-11-07 NOTE — ANESTHESIA PRE PROCEDURE
[START ON 11/8/2017] lactated ringers infusion   Intravenous Continuous Adamaris Matte,  mL/hr at 11/07/17 2001      sodium chloride flush 0.9 % injection 10 mL  10 mL Intravenous 2 times per day Gildardo Bobo DO        sodium chloride flush 0.9 % injection 10 mL  10 mL Intravenous PRN Adamaris Matte DO        [START ON 11/8/2017] lidocaine PF 1 % injection 1 mL  1 mL Intradermal Once PRN Gildardo Bobo DO        ceFAZolin (ANCEF) 2 g in dextrose 3 % 50 mL IVPB (duplex)  2 g Intravenous Once Mayra Martinez MD        dexamethasone (DECADRON) injection 10 mg  10 mg Intravenous Once Mayra Martinez MD        scopolamine (TRANSDERM-SCOP) transdermal patch 1 patch  1 patch Transdermal Once Mayra Martinez MD   1 patch at 11/07/17 8376       Allergies:  No Known Allergies    Problem List:  There is no problem list on file for this patient.       Past Medical History:        Diagnosis Date    Arthritis     Diabetes ([address]     Hearing aid worn     bilateral    [place] (hard of hearing)     Wears bilateral Hearing Aids    Hyperlipidemia     Hypertension        Past Surgical History:        Procedure Laterality Date    BACK SURGERY      lumbar spine    CATARACT REMOVAL Right 11/2016    CHOLECYSTECTOMY      NECK SURGERY         Social History:    Social History   Substance Use Topics    Smoking status: Never Smoker    Smokeless tobacco: Never Used    Alcohol use Yes      Comment: Rare                                Counseling given: Not Answered      Vital Signs (Current):   Vitals:    11/07/17 0630 11/07/17 0635   BP:  133/65   Pulse:  70   Resp:  16   Temp:  97.5 °F (36.4 °C)   TempSrc:  Oral   SpO2:  98%   Weight: 200 lb (90.7 kg)    Height: 6' 1\" (1.854 m)                                               BP Readings from Last 3 Encounters:   11/07/17 133/65   10/25/17 132/66       NPO Status: Time of last liquid consumption: 1800                        Time of last solid consumption: disease       Endo/Other:    (+) Type II DM, , : arthritis:. (-) hypothyroidism, hyperthyroidism, no Type I DM               Abdominal:         (-) obese     Vascular:     - PVD, DVT and PE. Anesthesia Plan      spinal, general and regional     ASA 3       Induction: intravenous. MIPS: Postoperative opioids intended. Anesthetic plan and risks discussed with patient. Plan discussed with CRNA.     Attending anesthesiologist reviewed and agrees with Jacinta Serna MD   11/7/2017

## 2017-11-07 NOTE — CARE COORDINATION
dialysis:      Takes more than 5 Prescription Medications:      Takes Digoxin,Insulin,Anticoagulants,Narcotics or ASA/Plavix:      Hospital Admit in Past 12 Months:      On Disability:      Patient Considers own Health:            ELECTRONIC SIGNATURE:    11/7/17 at 8:48 AM    Arjun Hair, MSN, RN, [address] [place] [address]    Phone: [phone] / Fax: [phone]

## 2017-11-07 NOTE — CARE COORDINATION
SNF or Requires Paid or Family Care:      Currently has CHF,COPD,ARF,CRI,or is on dialysis:      Takes more than 5 Prescription Medications:      Takes Digoxin,Insulin,Anticoagulants,Narcotics or ASA/Plavix:      Hospital Admit in Past 12 Months:      On Disability:      Patient Considers own Health:            ELECTRONIC SIGNATURE:    11/7/17 at 3:10 PM    CANDY Agrawal, RN, [address] [place] [address]    Phone: [phone] / Fax: [phone]

## 2017-11-07 NOTE — ANESTHESIA PROCEDURE NOTES
Peripheral Block    Patient location during procedure: OR  Start time: 11/7/2017 11:00 AM  End time: 11/7/2017 11:05 AM  Staffing  Anesthesiologist: Nakul Bang  Performed: anesthesiologist   Preanesthetic Checklist  Completed: patient identified, site marked, surgical consent, pre-op evaluation, timeout performed, IV checked, risks and benefits discussed, monitors and equipment checked, anesthesia consent given, oxygen available and patient being monitored  Peripheral Block  Patient position: supine  Prep: ChloraPrep  Patient monitoring: cardiac monitor, continuous pulse ox, frequent blood pressure checks and IV access  Block type: Fascia iliaca  Laterality: left  Injection technique: single-shot  Procedures: ultrasound guided  Infiltration strength: 1 %  Dose: 3 mL  Provider prep: mask and sterile gloves  Needle  Needle type: combined needle/nerve stimulator   Needle gauge: 18 G  Needle length: 10 cm  Needle localization: ultrasound guidance  Catheter type: open end  Catheter size: 20 G  Assessment  Injection assessment: negative aspiration for heme, no paresthesia on injection and local visualized surrounding nerve on ultrasound  Paresthesia pain: none  Slow fractionated injection: yes  Hemodynamics: stable  Additional Notes  Immediately prior to procedure a \"time out\" was called to verify the correct patient, allergies, laterality, procedure and equipment. Time out performed with RN    Local Anesthetic: 0.2%  Ropivacaine   Amount:40 ml  in 5 ml increments after negative aspiration each time.         Reason for block: post-op pain management and at surgeon's request

## 2017-11-08 ENCOUNTER — APPOINTMENT (OUTPATIENT)
Dept: CT IMAGING | Age: 72
DRG: 469 | End: 2017-11-08
Attending: ORTHOPAEDIC SURGERY
Payer: MEDICARE

## 2017-11-08 ENCOUNTER — HOSPITAL ENCOUNTER (INPATIENT)
Age: 72
LOS: 5 days | Discharge: NURSING, REHAB OR LONG-TERM CARE | DRG: 065 | End: 2017-11-13
Attending: INTERNAL MEDICINE | Admitting: INTERNAL MEDICINE
Payer: MEDICARE

## 2017-11-08 VITALS
OXYGEN SATURATION: 91 % | HEIGHT: 73 IN | DIASTOLIC BLOOD PRESSURE: 69 MMHG | TEMPERATURE: 99.1 F | WEIGHT: 200 LBS | HEART RATE: 101 BPM | SYSTOLIC BLOOD PRESSURE: 158 MMHG | BODY MASS INDEX: 26.51 KG/M2 | RESPIRATION RATE: 20 BRPM

## 2017-11-08 PROBLEM — E11.9 CONTROLLED TYPE 2 DIABETES MELLITUS WITHOUT COMPLICATION, WITH LONG-TERM CURRENT USE OF INSULIN (HCC): Status: ACTIVE | Noted: 2017-11-08

## 2017-11-08 LAB
ABSOLUTE EOS #: 0 K/UL (ref 0–0.4)
ABSOLUTE IMMATURE GRANULOCYTE: ABNORMAL K/UL (ref 0–0.3)
ABSOLUTE LYMPH #: 1.1 K/UL (ref 1–4.8)
ABSOLUTE MONO #: 1.1 K/UL (ref 0.2–0.8)
ANION GAP SERPL CALCULATED.3IONS-SCNC: 9 MMOL/L (ref 9–17)
BASOPHILS # BLD: 0 %
BASOPHILS ABSOLUTE: 0 K/UL (ref 0–0.2)
BUN BLDV-MCNC: 13 MG/DL (ref 8–23)
BUN/CREAT BLD: 20 (ref 9–20)
CALCIUM SERPL-MCNC: 8.4 MG/DL (ref 8.6–10.4)
CHLORIDE BLD-SCNC: 99 MMOL/L (ref 98–107)
CO2: 28 MMOL/L (ref 20–31)
CREAT SERPL-MCNC: 0.66 MG/DL (ref 0.7–1.2)
DIFFERENTIAL TYPE: ABNORMAL
EOSINOPHILS RELATIVE PERCENT: 0 %
GFR AFRICAN AMERICAN: >60 ML/MIN
GFR NON-AFRICAN AMERICAN: >60 ML/MIN
GFR SERPL CREATININE-BSD FRML MDRD: ABNORMAL ML/MIN/{1.73_M2}
GFR SERPL CREATININE-BSD FRML MDRD: ABNORMAL ML/MIN/{1.73_M2}
GLUCOSE BLD-MCNC: 169 MG/DL (ref 75–110)
GLUCOSE BLD-MCNC: 174 MG/DL (ref 75–110)
GLUCOSE BLD-MCNC: 176 MG/DL (ref 75–110)
GLUCOSE BLD-MCNC: 179 MG/DL (ref 70–99)
HCT VFR BLD CALC: 35.8 % (ref 41–53)
HEMOGLOBIN: 12 G/DL (ref 13.5–17.5)
IMMATURE GRANULOCYTES: ABNORMAL %
LYMPHOCYTES # BLD: 10 %
MCH RBC QN AUTO: 29.7 PG (ref 26–34)
MCHC RBC AUTO-ENTMCNC: 33.5 G/DL (ref 31–37)
MCV RBC AUTO: 88.8 FL (ref 80–100)
MONOCYTES # BLD: 10 %
PDW BLD-RTO: 12.4 % (ref 11.5–14.5)
PLATELET # BLD: 256 K/UL (ref 130–400)
PLATELET ESTIMATE: ABNORMAL
PMV BLD AUTO: ABNORMAL FL (ref 6–12)
POTASSIUM SERPL-SCNC: 4 MMOL/L (ref 3.7–5.3)
RBC # BLD: 4.03 M/UL (ref 4.5–5.9)
RBC # BLD: ABNORMAL 10*6/UL
SEG NEUTROPHILS: 80 %
SEGMENTED NEUTROPHILS ABSOLUTE COUNT: 9.7 K/UL (ref 1.8–7.7)
SODIUM BLD-SCNC: 136 MMOL/L (ref 135–144)
WBC # BLD: 11.9 K/UL (ref 3.5–11)
WBC # BLD: ABNORMAL 10*3/UL

## 2017-11-08 PROCEDURE — 80048 BASIC METABOLIC PNL TOTAL CA: CPT

## 2017-11-08 PROCEDURE — 99221 1ST HOSP IP/OBS SF/LOW 40: CPT | Performed by: INTERNAL MEDICINE

## 2017-11-08 PROCEDURE — 70496 CT ANGIOGRAPHY HEAD: CPT

## 2017-11-08 PROCEDURE — 2580000003 HC RX 258: Performed by: INTERNAL MEDICINE

## 2017-11-08 PROCEDURE — 97110 THERAPEUTIC EXERCISES: CPT

## 2017-11-08 PROCEDURE — 51798 US URINE CAPACITY MEASURE: CPT

## 2017-11-08 PROCEDURE — 51701 INSERT BLADDER CATHETER: CPT

## 2017-11-08 PROCEDURE — 70498 CT ANGIOGRAPHY NECK: CPT

## 2017-11-08 PROCEDURE — 85025 COMPLETE CBC W/AUTO DIFF WBC: CPT

## 2017-11-08 PROCEDURE — 6360000004 HC RX CONTRAST MEDICATION: Performed by: INTERNAL MEDICINE

## 2017-11-08 PROCEDURE — 6360000002 HC RX W HCPCS: Performed by: INTERNAL MEDICINE

## 2017-11-08 PROCEDURE — 2580000003 HC RX 258: Performed by: ORTHOPAEDIC SURGERY

## 2017-11-08 PROCEDURE — 70450 CT HEAD/BRAIN W/O DYE: CPT

## 2017-11-08 PROCEDURE — 97530 THERAPEUTIC ACTIVITIES: CPT

## 2017-11-08 PROCEDURE — 36415 COLL VENOUS BLD VENIPUNCTURE: CPT

## 2017-11-08 PROCEDURE — 6370000000 HC RX 637 (ALT 250 FOR IP): Performed by: ORTHOPAEDIC SURGERY

## 2017-11-08 PROCEDURE — 87641 MR-STAPH DNA AMP PROBE: CPT

## 2017-11-08 PROCEDURE — 82947 ASSAY GLUCOSE BLOOD QUANT: CPT

## 2017-11-08 PROCEDURE — 2000000003 HC NEURO ICU R&B

## 2017-11-08 PROCEDURE — 97530 THERAPEUTIC ACTIVITIES: CPT | Performed by: NURSE PRACTITIONER

## 2017-11-08 PROCEDURE — 6370000000 HC RX 637 (ALT 250 FOR IP): Performed by: INTERNAL MEDICINE

## 2017-11-08 PROCEDURE — 97535 SELF CARE MNGMENT TRAINING: CPT | Performed by: NURSE PRACTITIONER

## 2017-11-08 PROCEDURE — 99291 CRITICAL CARE FIRST HOUR: CPT | Performed by: PSYCHIATRY & NEUROLOGY

## 2017-11-08 RX ORDER — LISINOPRIL 20 MG/1
20 TABLET ORAL 2 TIMES DAILY
Status: DISCONTINUED | OUTPATIENT
Start: 2017-11-08 | End: 2017-11-09

## 2017-11-08 RX ORDER — METOPROLOL SUCCINATE 50 MG/1
50 TABLET, EXTENDED RELEASE ORAL DAILY
Status: DISCONTINUED | OUTPATIENT
Start: 2017-11-09 | End: 2017-11-10

## 2017-11-08 RX ORDER — ACETAMINOPHEN 325 MG/1
650 TABLET ORAL EVERY 4 HOURS PRN
Status: DISCONTINUED | OUTPATIENT
Start: 2017-11-08 | End: 2017-11-13 | Stop reason: HOSPADM

## 2017-11-08 RX ORDER — HALOPERIDOL 5 MG/ML
2 INJECTION INTRAMUSCULAR ONCE
Status: DISCONTINUED | OUTPATIENT
Start: 2017-11-08 | End: 2017-11-08 | Stop reason: HOSPADM

## 2017-11-08 RX ORDER — LORAZEPAM 2 MG/ML
2 INJECTION INTRAMUSCULAR ONCE
Status: COMPLETED | OUTPATIENT
Start: 2017-11-08 | End: 2017-11-08

## 2017-11-08 RX ORDER — HYDROXYZINE HYDROCHLORIDE 25 MG/1
25 TABLET, FILM COATED ORAL 3 TIMES DAILY PRN
Status: CANCELLED | OUTPATIENT
Start: 2017-11-08

## 2017-11-08 RX ORDER — NICOTINE POLACRILEX 4 MG
15 LOZENGE BUCCAL PRN
Status: CANCELLED | OUTPATIENT
Start: 2017-11-08

## 2017-11-08 RX ORDER — 0.9 % SODIUM CHLORIDE 0.9 %
50 INTRAVENOUS SOLUTION INTRAVENOUS ONCE
Status: COMPLETED | OUTPATIENT
Start: 2017-11-08 | End: 2017-11-08

## 2017-11-08 RX ORDER — ATORVASTATIN CALCIUM 40 MG/1
40 TABLET, FILM COATED ORAL NIGHTLY
Status: CANCELLED | OUTPATIENT
Start: 2017-11-08

## 2017-11-08 RX ORDER — SODIUM CHLORIDE 0.9 % (FLUSH) 0.9 %
10 SYRINGE (ML) INJECTION PRN
Status: CANCELLED | OUTPATIENT
Start: 2017-11-08

## 2017-11-08 RX ORDER — ACETAMINOPHEN 325 MG/1
650 TABLET ORAL EVERY 4 HOURS PRN
Status: CANCELLED | OUTPATIENT
Start: 2017-11-08

## 2017-11-08 RX ORDER — HYDROCODONE BITARTRATE AND ACETAMINOPHEN 5; 325 MG/1; MG/1
1 TABLET ORAL EVERY 4 HOURS PRN
Status: CANCELLED | OUTPATIENT
Start: 2017-11-08

## 2017-11-08 RX ORDER — SODIUM CHLORIDE 0.9 % (FLUSH) 0.9 %
10 SYRINGE (ML) INJECTION EVERY 12 HOURS SCHEDULED
Status: DISCONTINUED | OUTPATIENT
Start: 2017-11-08 | End: 2017-11-13 | Stop reason: HOSPADM

## 2017-11-08 RX ORDER — SODIUM CHLORIDE 0.9 % (FLUSH) 0.9 %
10 SYRINGE (ML) INJECTION EVERY 12 HOURS SCHEDULED
Status: CANCELLED | OUTPATIENT
Start: 2017-11-08

## 2017-11-08 RX ORDER — DOCUSATE SODIUM 100 MG/1
100 CAPSULE, LIQUID FILLED ORAL 2 TIMES DAILY
Qty: 30 CAPSULE | Refills: 0 | Status: SHIPPED | OUTPATIENT
Start: 2017-11-08

## 2017-11-08 RX ORDER — OXYCODONE HYDROCHLORIDE AND ACETAMINOPHEN 5; 325 MG/1; MG/1
1-2 TABLET ORAL EVERY 4 HOURS PRN
Qty: 80 TABLET | Refills: 0 | Status: ON HOLD | OUTPATIENT
Start: 2017-11-08 | End: 2017-11-11 | Stop reason: HOSPADM

## 2017-11-08 RX ORDER — ONDANSETRON 4 MG/1
4 TABLET, FILM COATED ORAL EVERY 6 HOURS PRN
Qty: 30 TABLET | Refills: 1 | Status: SHIPPED | OUTPATIENT
Start: 2017-11-08

## 2017-11-08 RX ORDER — SODIUM CHLORIDE 0.9 % (FLUSH) 0.9 %
10 SYRINGE (ML) INJECTION PRN
Status: DISCONTINUED | OUTPATIENT
Start: 2017-11-08 | End: 2017-11-13 | Stop reason: HOSPADM

## 2017-11-08 RX ORDER — SODIUM CHLORIDE 9 MG/ML
INJECTION, SOLUTION INTRAVENOUS CONTINUOUS
Status: DISCONTINUED | OUTPATIENT
Start: 2017-11-08 | End: 2017-11-13 | Stop reason: HOSPADM

## 2017-11-08 RX ORDER — NICOTINE POLACRILEX 4 MG
15 LOZENGE BUCCAL PRN
Status: DISCONTINUED | OUTPATIENT
Start: 2017-11-08 | End: 2017-11-13 | Stop reason: HOSPADM

## 2017-11-08 RX ORDER — ATORVASTATIN CALCIUM 40 MG/1
40 TABLET, FILM COATED ORAL NIGHTLY
Status: DISCONTINUED | OUTPATIENT
Start: 2017-11-08 | End: 2017-11-13 | Stop reason: HOSPADM

## 2017-11-08 RX ORDER — DOCUSATE SODIUM 100 MG/1
100 CAPSULE, LIQUID FILLED ORAL 2 TIMES DAILY
Status: CANCELLED | OUTPATIENT
Start: 2017-11-08

## 2017-11-08 RX ORDER — SODIUM CHLORIDE 0.9 % (FLUSH) 0.9 %
10 SYRINGE (ML) INJECTION PRN
Status: DISCONTINUED | OUTPATIENT
Start: 2017-11-08 | End: 2017-11-08 | Stop reason: HOSPADM

## 2017-11-08 RX ORDER — DEXTROSE MONOHYDRATE 50 MG/ML
100 INJECTION, SOLUTION INTRAVENOUS PRN
Status: DISCONTINUED | OUTPATIENT
Start: 2017-11-08 | End: 2017-11-13 | Stop reason: HOSPADM

## 2017-11-08 RX ORDER — LORAZEPAM 0.5 MG/1
0.5 TABLET ORAL EVERY 4 HOURS PRN
Status: DISCONTINUED | OUTPATIENT
Start: 2017-11-08 | End: 2017-11-13 | Stop reason: HOSPADM

## 2017-11-08 RX ORDER — TAMSULOSIN HYDROCHLORIDE 0.4 MG/1
0.4 CAPSULE ORAL NIGHTLY
Status: CANCELLED | OUTPATIENT
Start: 2017-11-08

## 2017-11-08 RX ORDER — METOPROLOL SUCCINATE 50 MG/1
50 TABLET, EXTENDED RELEASE ORAL DAILY
Status: CANCELLED | OUTPATIENT
Start: 2017-11-09

## 2017-11-08 RX ORDER — DEXTROSE MONOHYDRATE 25 G/50ML
12.5 INJECTION, SOLUTION INTRAVENOUS PRN
Status: CANCELLED | OUTPATIENT
Start: 2017-11-08

## 2017-11-08 RX ORDER — TAMSULOSIN HYDROCHLORIDE 0.4 MG/1
0.4 CAPSULE ORAL NIGHTLY
Status: DISCONTINUED | OUTPATIENT
Start: 2017-11-08 | End: 2017-11-13 | Stop reason: HOSPADM

## 2017-11-08 RX ORDER — HYDROCODONE BITARTRATE AND ACETAMINOPHEN 5; 325 MG/1; MG/1
1 TABLET ORAL EVERY 4 HOURS PRN
Status: DISCONTINUED | OUTPATIENT
Start: 2017-11-08 | End: 2017-11-13 | Stop reason: HOSPADM

## 2017-11-08 RX ORDER — DEXTROSE MONOHYDRATE 25 G/50ML
12.5 INJECTION, SOLUTION INTRAVENOUS PRN
Status: DISCONTINUED | OUTPATIENT
Start: 2017-11-08 | End: 2017-11-13 | Stop reason: HOSPADM

## 2017-11-08 RX ORDER — LORAZEPAM 0.5 MG/1
0.5 TABLET ORAL EVERY 4 HOURS PRN
Status: CANCELLED | OUTPATIENT
Start: 2017-11-08

## 2017-11-08 RX ORDER — ONDANSETRON 2 MG/ML
4 INJECTION INTRAMUSCULAR; INTRAVENOUS EVERY 6 HOURS PRN
Status: CANCELLED | OUTPATIENT
Start: 2017-11-08

## 2017-11-08 RX ORDER — HYDROXYZINE HYDROCHLORIDE 25 MG/1
25 TABLET, FILM COATED ORAL 3 TIMES DAILY PRN
Status: DISCONTINUED | OUTPATIENT
Start: 2017-11-08 | End: 2017-11-13 | Stop reason: HOSPADM

## 2017-11-08 RX ORDER — DEXTROSE MONOHYDRATE 50 MG/ML
100 INJECTION, SOLUTION INTRAVENOUS PRN
Status: CANCELLED | OUTPATIENT
Start: 2017-11-08

## 2017-11-08 RX ORDER — SODIUM CHLORIDE 9 MG/ML
INJECTION, SOLUTION INTRAVENOUS CONTINUOUS
Status: CANCELLED | OUTPATIENT
Start: 2017-11-08

## 2017-11-08 RX ORDER — LISINOPRIL 20 MG/1
20 TABLET ORAL 2 TIMES DAILY
Status: CANCELLED | OUTPATIENT
Start: 2017-11-08

## 2017-11-08 RX ORDER — ONDANSETRON 2 MG/ML
4 INJECTION INTRAMUSCULAR; INTRAVENOUS EVERY 6 HOURS PRN
Status: DISCONTINUED | OUTPATIENT
Start: 2017-11-08 | End: 2017-11-13 | Stop reason: HOSPADM

## 2017-11-08 RX ORDER — HYDROCODONE BITARTRATE AND ACETAMINOPHEN 5; 325 MG/1; MG/1
1 TABLET ORAL EVERY 4 HOURS PRN
Status: DISCONTINUED | OUTPATIENT
Start: 2017-11-08 | End: 2017-11-08 | Stop reason: HOSPADM

## 2017-11-08 RX ORDER — LABETALOL HYDROCHLORIDE 5 MG/ML
10 INJECTION, SOLUTION INTRAVENOUS ONCE
Status: DISCONTINUED | OUTPATIENT
Start: 2017-11-08 | End: 2017-11-13 | Stop reason: HOSPADM

## 2017-11-08 RX ORDER — DOCUSATE SODIUM 100 MG/1
100 CAPSULE, LIQUID FILLED ORAL 2 TIMES DAILY
Status: DISCONTINUED | OUTPATIENT
Start: 2017-11-08 | End: 2017-11-13 | Stop reason: HOSPADM

## 2017-11-08 RX ORDER — AMLODIPINE BESYLATE 5 MG/1
5 TABLET ORAL DAILY
Status: CANCELLED | OUTPATIENT
Start: 2017-11-09

## 2017-11-08 RX ORDER — AMLODIPINE BESYLATE 5 MG/1
5 TABLET ORAL DAILY
Status: DISCONTINUED | OUTPATIENT
Start: 2017-11-09 | End: 2017-11-10

## 2017-11-08 RX ADMIN — SODIUM CHLORIDE: 9 INJECTION, SOLUTION INTRAVENOUS at 08:34

## 2017-11-08 RX ADMIN — APIXABAN 2.5 MG: 2.5 TABLET, FILM COATED ORAL at 12:41

## 2017-11-08 RX ADMIN — GABAPENTIN 300 MG: 300 CAPSULE ORAL at 08:25

## 2017-11-08 RX ADMIN — DOCUSATE SODIUM 100 MG: 100 CAPSULE ORAL at 22:16

## 2017-11-08 RX ADMIN — DOCUSATE SODIUM 100 MG: 100 CAPSULE, LIQUID FILLED ORAL at 08:25

## 2017-11-08 RX ADMIN — IOPAMIDOL 100 ML: 755 INJECTION, SOLUTION INTRAVENOUS at 17:56

## 2017-11-08 RX ADMIN — LISINOPRIL 20 MG: 20 TABLET ORAL at 22:13

## 2017-11-08 RX ADMIN — TAMSULOSIN HYDROCHLORIDE 0.4 MG: 0.4 CAPSULE ORAL at 22:16

## 2017-11-08 RX ADMIN — LISINOPRIL 20 MG: 20 TABLET ORAL at 12:41

## 2017-11-08 RX ADMIN — SODIUM CHLORIDE: 9 INJECTION, SOLUTION INTRAVENOUS at 17:02

## 2017-11-08 RX ADMIN — Medication 10 ML: at 22:24

## 2017-11-08 RX ADMIN — ATORVASTATIN CALCIUM 40 MG: 40 TABLET, FILM COATED ORAL at 22:16

## 2017-11-08 RX ADMIN — LORAZEPAM 2 MG: 2 INJECTION INTRAMUSCULAR; INTRAVENOUS at 17:00

## 2017-11-08 RX ADMIN — AMLODIPINE BESYLATE 5 MG: 5 TABLET ORAL at 12:41

## 2017-11-08 RX ADMIN — SODIUM CHLORIDE: 9 INJECTION, SOLUTION INTRAVENOUS at 22:24

## 2017-11-08 RX ADMIN — METFORMIN HYDROCHLORIDE 500 MG: 500 TABLET, FILM COATED ORAL at 08:27

## 2017-11-08 RX ADMIN — Medication 10 ML: at 08:28

## 2017-11-08 RX ADMIN — INSULIN LISPRO 1 UNITS: 100 INJECTION, SOLUTION INTRAVENOUS; SUBCUTANEOUS at 22:35

## 2017-11-08 RX ADMIN — SODIUM CHLORIDE 50 ML: 0.9 INJECTION, SOLUTION INTRAVENOUS at 17:57

## 2017-11-08 RX ADMIN — SODIUM CHLORIDE, PRESERVATIVE FREE 10 ML: 5 INJECTION INTRAVENOUS at 17:57

## 2017-11-08 ASSESSMENT — PAIN SCALES - GENERAL
PAINLEVEL_OUTOF10: 0

## 2017-11-08 NOTE — FLOWSHEET NOTE
Patient sitting in chair; expresses no spiritual/emotional needs at this time.  provides presence and support; presents patient with Elena kiran as thank you on behalf of the [facility] for his [facility]. Patient thanks  for visiting    Spiritual Care will follow as needed. 11/08/17 1033   Encounter Summary   Services provided to: Patient   Referral/Consult From: Ady Baum of [address] [place] [address] [place]  [place]   Contact Gnosticist Completed   Continue Visiting (11/8/17)   Complexity of Encounter Low   Length of Encounter 15 minutes   Spiritual Assessment Completed Yes   Routine   Type Initial   Assessment Passive   Intervention Active listening   Outcome Expressed gratitude

## 2017-11-08 NOTE — CARE COORDINATION
CIERRA informed by Yair Nickerson the Orthopedic Nurse Navigator the pt will need a SNF. SW went to pt room to discuss discharge planning. Pt is confused and pt spouse was not in the room. CIERRA spoke with pt RN ROB regarding if pt spouse left. ROB reports the spouse might have went to get something to eat. CIERRA will try to meet with pt spouse later. CIERRA met with pt spouse [facility] regarding discharge plan. [facility] requested referral to [facility]. [facility] is thinking about the [facility] as a second choice. SW will fax referral to [facility] pt spouse will confirm final choice tomorrow. SW encouraged spouse to visit the facilities.

## 2017-11-08 NOTE — OP NOTE
[address] [address]                               OPERATIVE REPORT    PATIENT NAME: Daryn Jones                    :         1945  MED REC NO:   [id number]                             ROOM:         ACCOUNT NO:   [de-identified]                           ADMIT DATE:  2017  PROVIDER:     Brooklyn Branham    DATE OF PROCEDURE:  2017    SURGEON:  Dr. Brooklyn Branham. ASSISTANT:  Marshal Kelly DO    PREOPERATIVE DIAGNOSIS:  Left hip osteoarthritis. POSTOPERATIVE DIAGNOSIS:  Left hip osteoarthritis. OPERATION PERFORMED:  Left total hip arthroplasty using anterior  supine intermuscular approach. ANESTHESIA:  General.    COMPLICATIONS:  None. SPECIMENS:  None. IMPLANTS USED:  We used a Biomet total hip arthroplasty, a size-56 mm  acetabular shell, a size 40-mm acetabular liner. There is one 6.5 x  25 screw. We used a 13 x 111 Taperloc stem and a size 40 mm  cobalt-chrome femoral head. FINDINGS:  Severe degenerative joint disease. NARRATIVE:  The patient is a 28-year-old male with a longstanding  history of left hip pain, did have known diagnosis of severe left hip  osteoarthritis. The patient has elected to undergo a total hip  arthroplasty for treatment of his problems. After informed consent  was obtained, the patient was administered with preoperative  antibiotics. He was transported to the operating room and placed on  the operating room table. Attempts to the spinal anesthesia were  performed by Anesthesia, but they were unable to get it. So, we  ended-up placing him under general anesthesia. After the patient was  placed under general anesthesia, he was positioned on the table, so  that the level of his greater trochanter was at the break of the  table. A Bowers catheter was inserted and took underneath his right  leg, protected by a _____.   A sheath was then used to drape out was impacted on to the  trunnion of the femur. The hip was reduced. The leg lengths were  restored. _____ was obtained in all directions and the wound was  thoroughly irrigated with Betadine solution and a jet lavage. A joint  cocktail was used to inject the periarticular soft tissues as well as  the skin and the split in our fascia was closed with a running 2-0  Vicryl suture. Skin was closed with an 0 Vicryl and 2-0 Vicryl and a  3-0 Monocryl was used to run the skin. Skin glue was placed over the  incision and then an Aquacel dressing was placed and the patient was  transported to recovery in stable condition.         Linh Albarado    D: 11/07/2017 11:18:40       T: 11/07/2017 23:37:59     [email or web address]  Job#: [id number]     Doc#: [id number]

## 2017-11-08 NOTE — CARE COORDINATION
CONVERSATION WITH LSW:    Patient discharge changed from [place] [address] to SNF due to altered mental status and poor physical strength and control. Family is at bedside. Family is leaning towards [facility] for [facility].     Arjun Hair, MSN, RN, CNL on 11/8/17 at 2:25 PM  Orthopedic Nurse Navigator - [facility] DR JA MERINO  Phone: [phone] / Fax: [phone]

## 2017-11-08 NOTE — CONSULTS
ondansetron (ZOFRAN) 4 MG tablet Take 1 tablet by mouth every 6 hours as needed for Nausea 11/8/17  Yes Anyi Marcum MD   apixaban Lawrence Ericka) 2.5 MG TABS tablet Take 1 tablet by mouth 2 times daily 11/8/17 12/8/17 Yes Anyi Marcum MD   metFORMIN (GLUCOPHAGE) 500 MG tablet Take 500 mg by mouth 2 times daily (with meals)   Yes Historical Provider, MD   gabapentin (NEURONTIN) 300 MG capsule Take 300 mg by mouth 2 times daily   Yes Historical Provider, MD   amLODIPine (NORVASC) 5 MG tablet Take 5 mg by mouth daily   Yes Historical Provider, MD   lisinopril (PRINIVIL;ZESTRIL) 20 MG tablet Take 20 mg by mouth 2 times daily   Yes Historical Provider, MD   Cholecalciferol (VITAMIN D3 PO) Take 1 tablet by mouth 2 times daily   Yes Historical Provider, MD   Insulin NPH Isophane & Regular (HUMULIN 70/30 PEN) (70-30) 100 UNIT per ML injection pen Inject 30 Units into the skin every morning   Yes Historical Provider, MD   Insulin NPH Isophane & Regular (HUMULIN 70/30 PEN) (70-30) 100 UNIT per ML injection pen Inject 22 Units into the skin nightly   Yes Historical Provider, MD   tamsulosin (FLOMAX) 0.4 MG capsule Take 0.4 mg by mouth nightly   Yes Historical Provider, MD   metoprolol succinate (TOPROL XL) 50 MG extended release tablet Take 50 mg by mouth daily   Yes Historical Provider, MD   atorvastatin (LIPITOR) 40 MG tablet Take 40 mg by mouth nightly    Historical Provider, MD      Allergies:     Review of patient's allergies indicates no known allergies. Social History:     Tobacco:    reports that he has never smoked. He has never used smokeless tobacco.  Alcohol:      reports that he drinks alcohol. Drug Use:  reports that he does not use drugs. Family History:     History reviewed. No pertinent family history.     Review of Systems:   Confused at my visit but able to tell physical symptoms very well, objective information from staff also used    CONSTITUTIONAL:  negative for fevers, chills, sweats, fatigue, weight loss  HEENT:  negative for vision, hearing changes  RESPIRATORY:  negative for shortness of breath, cough, congestion, wheezing. CARDIOVASCULAR:  negative for chest pain, palpitations. GASTROINTESTINAL:  negative for nausea, vomiting, diarrhea, constipation, change in bowel habits, abdominal pain   GENITOURINARY:  negative for difficulty of urination, burning with urination, frequency   INTEGUMENT:  negative for rash, skin lesions, easy bruising   HEMATOLOGIC/LYMPHATIC:  negative for swelling/edema   ALLERGIC/IMMUNOLOGIC:  negative for urticaria , itching  ENDOCRINE:  negative increase in drinking, increase in urination, hot or cold intolerance  MUSCULOSKELETAL: some pain reported in hip area  NEUROLOGICAL:  negative for headaches, dizziness, lightheadedness, numbness, pain, tingling extremities  BEHAVIOR/PSYCH:  negative for depression, anxiety    Physical Exam:     /61   Pulse 85   Temp 98.1 °F (36.7 °C) (Oral)   Resp 20   Ht 6' 1\" (1.854 m)   Wt 200 lb (90.7 kg)   SpO2 100%   BMI 26.39 kg/m²   Temp (24hrs), Av.2 °F (36.8 °C), Min:97.2 °F (36.2 °C), Max:99.1 °F (37.3 °C)    Recent Labs      17   0644  17   1143  17   1619  17   2050   POCGLU  146*  181*  204*  169*       Intake/Output Summary (Last 24 hours) at 17 1155  Last data filed at 17 0834   Gross per 24 hour   Intake             2474 ml   Output              795 ml   Net             1679 ml     General Appearance:  alert, well appearing, and in no acute distress  Mental status: oriented to place, disoriented to time.  Follows motor commands well   Head:  normocephalic, atraumatic  Eye: no icterus, redness, pupils equal and reactive, extraocular eye movements intact, conjunctiva clear  Ear: normal external ear, no discharge, hearing intact  Nose:  no drainage noted  Mouth: mucous membranes moist  Neck: supple, no carotid bruits, thyroid not palpable  Lungs: Bilateral equal air entry, clear to ausculation, no wheezing, rales or rhonchi, normal effort  Cardiovascular: normal rate, regular rhythm, no murmur, gallop, rub.   Abdomen: Soft, nontender, nondistended, normal bowel sounds, no hepatomegaly or splenomegaly  Neurologic: There are no new focal motor or sensory deficits, normal muscle tone and bulk, no abnormal sensation, normal speech, cranial nerves II through XII grossly intact  Skin: No gross lesions, rashes, bruising or bleeding on exposed skin area  Extremities:  peripheral pulses palpable, left hip surgical site noted  Psych: normal affect     Investigations:      Laboratory Testing:  Recent Results (from the past 24 hour(s))   BUN & CREATININE    Collection Time: 11/07/17  3:33 PM   Result Value Ref Range    BUN 12 8 - 23 mg/dL    CREATININE 0.77 0.70 - 1.20 mg/dL    GFR Non-African American >60 >60 mL/min    GFR African American >60 >60 mL/min    GFR Comment          GFR Staging NOT REPORTED    POC Glucose Fingerstick    Collection Time: 11/07/17  4:19 PM   Result Value Ref Range    POC Glucose 204 (H) 75 - 110 mg/dL   POC Glucose Fingerstick    Collection Time: 11/07/17  8:50 PM   Result Value Ref Range    POC Glucose 169 (H) 75 - 110 mg/dL   CBC Auto Differential    Collection Time: 11/08/17  7:56 AM   Result Value Ref Range    WBC 11.9 (H) 3.5 - 11.0 k/uL    RBC 4.03 (L) 4.5 - 5.9 m/uL    Hemoglobin 12.0 (L) 13.5 - 17.5 g/dL    Hematocrit 35.8 (L) 41 - 53 %    MCV 88.8 80 - 100 fL    MCH 29.7 26 - 34 pg    MCHC 33.5 31 - 37 g/dL    RDW 12.4 11.5 - 14.5 %    Platelets 866 474 - 323 k/uL    MPV NOT REPORTED 6.0 - 12.0 fL    Differential Type NOT REPORTED     Immature Granulocytes NOT REPORTED 0 %    Absolute Immature Granulocyte NOT REPORTED 0.00 - 0.30 k/uL    WBC Morphology NOT REPORTED     RBC Morphology NOT REPORTED     Platelet Estimate NOT REPORTED     Seg Neutrophils 80 %    Lymphocytes 10 %    Monocytes 10 %    Eosinophils % 0 %    Basophils 0 %    Segs Absolute 9.70 (H) 1.8 - 7.7 k/uL

## 2017-11-08 NOTE — PLAN OF CARE
[facility]    Second Visit Note  For more detailed information please refer to the progress note of the day      11/8/2017    4:51 PM    Name:   Tena De Leon  MRN:     [id number]     Carmela:      [de-identified]   Room:   2021/2021-01  IP Day:  1  Admit Date:  11/7/2017  6:19 AM    PCP:   Mallory Wagner MD  Code Status:  Full Code        Pt vitals were reviewed   New labs were reviewed   Patient was seen    Updated plan :     1. Contacted by radiologist that pt has an acute cva occipital region  2. I immediately then contacted neurointerventionalist from [facility] who wanted stat cta head/neck and to be contacted when they are done. If the exam reveals that intervention is possible, will arrange immediate transfer to [facility]; if not, pt will be kept here  3.  Wife/family updated-they are very appreciative of our timely care        Giselle Christine, DO  11/8/2017  4:51 PM

## 2017-11-09 ENCOUNTER — APPOINTMENT (OUTPATIENT)
Dept: MRI IMAGING | Age: 72
DRG: 065 | End: 2017-11-09
Attending: INTERNAL MEDICINE
Payer: MEDICARE

## 2017-11-09 PROBLEM — I65.01 STENOSIS OF RIGHT VERTEBRAL ARTERY: Status: ACTIVE | Noted: 2017-11-09

## 2017-11-09 LAB
ABSOLUTE EOS #: <0.03 K/UL (ref 0–0.44)
ABSOLUTE IMMATURE GRANULOCYTE: 0.05 K/UL (ref 0–0.3)
ABSOLUTE LYMPH #: 1.18 K/UL (ref 1.1–3.7)
ABSOLUTE MONO #: 1 K/UL (ref 0.1–1.2)
ANION GAP SERPL CALCULATED.3IONS-SCNC: 9 MMOL/L (ref 9–17)
BASOPHILS # BLD: 0 % (ref 0–2)
BASOPHILS ABSOLUTE: 0.04 K/UL (ref 0–0.2)
BUN BLDV-MCNC: 9 MG/DL (ref 8–23)
BUN/CREAT BLD: ABNORMAL (ref 9–20)
CALCIUM SERPL-MCNC: 8.3 MG/DL (ref 8.6–10.4)
CHLORIDE BLD-SCNC: 100 MMOL/L (ref 98–107)
CHOLESTEROL/HDL RATIO: 2.7
CHOLESTEROL: 98 MG/DL
CO2: 24 MMOL/L (ref 20–31)
CREAT SERPL-MCNC: 0.6 MG/DL (ref 0.7–1.2)
DIFFERENTIAL TYPE: ABNORMAL
EOSINOPHILS RELATIVE PERCENT: 0 % (ref 1–4)
ESTIMATED AVERAGE GLUCOSE: 126 MG/DL
GFR AFRICAN AMERICAN: >60 ML/MIN
GFR NON-AFRICAN AMERICAN: >60 ML/MIN
GFR SERPL CREATININE-BSD FRML MDRD: ABNORMAL ML/MIN/{1.73_M2}
GFR SERPL CREATININE-BSD FRML MDRD: ABNORMAL ML/MIN/{1.73_M2}
GLUCOSE BLD-MCNC: 142 MG/DL (ref 75–110)
GLUCOSE BLD-MCNC: 143 MG/DL (ref 75–110)
GLUCOSE BLD-MCNC: 149 MG/DL (ref 70–99)
GLUCOSE BLD-MCNC: 153 MG/DL (ref 75–110)
GLUCOSE BLD-MCNC: 154 MG/DL (ref 75–110)
HBA1C MFR BLD: 6 % (ref 4–6)
HCT VFR BLD CALC: 31.9 % (ref 40.7–50.3)
HDLC SERPL-MCNC: 36 MG/DL
HEMOGLOBIN: 10.3 G/DL (ref 13–17)
IMMATURE GRANULOCYTES: 1 %
INR BLD: 1.1
LDL CHOLESTEROL: 48 MG/DL (ref 0–130)
LYMPHOCYTES # BLD: 11 % (ref 24–43)
MCH RBC QN AUTO: 29.6 PG (ref 25.2–33.5)
MCHC RBC AUTO-ENTMCNC: 32.3 G/DL (ref 28.4–34.8)
MCV RBC AUTO: 91.7 FL (ref 82.6–102.9)
MONOCYTES # BLD: 9 % (ref 3–12)
MRSA, DNA, NASAL: NORMAL
PARTIAL THROMBOPLASTIN TIME: 28 SEC (ref 21.3–31.3)
PDW BLD-RTO: 12.1 % (ref 11.8–14.4)
PLATELET # BLD: 191 K/UL (ref 138–453)
PLATELET ESTIMATE: ABNORMAL
PMV BLD AUTO: 9.6 FL (ref 8.1–13.5)
POTASSIUM SERPL-SCNC: 3.7 MMOL/L (ref 3.7–5.3)
PROTHROMBIN TIME: 11.9 SEC (ref 9.4–12.6)
RBC # BLD: 3.48 M/UL (ref 4.21–5.77)
RBC # BLD: ABNORMAL 10*6/UL
SEG NEUTROPHILS: 79 % (ref 36–65)
SEGMENTED NEUTROPHILS ABSOLUTE COUNT: 8.42 K/UL (ref 1.5–8.1)
SODIUM BLD-SCNC: 133 MMOL/L (ref 135–144)
SPECIMEN DESCRIPTION: NORMAL
TRIGL SERPL-MCNC: 71 MG/DL
VLDLC SERPL CALC-MCNC: ABNORMAL MG/DL (ref 1–30)
WBC # BLD: 10.7 K/UL (ref 3.5–11.3)
WBC # BLD: ABNORMAL 10*3/UL

## 2017-11-09 PROCEDURE — 85730 THROMBOPLASTIN TIME PARTIAL: CPT

## 2017-11-09 PROCEDURE — 97535 SELF CARE MNGMENT TRAINING: CPT

## 2017-11-09 PROCEDURE — 2000000003 HC NEURO ICU R&B

## 2017-11-09 PROCEDURE — 6370000000 HC RX 637 (ALT 250 FOR IP): Performed by: INTERNAL MEDICINE

## 2017-11-09 PROCEDURE — 80061 LIPID PANEL: CPT

## 2017-11-09 PROCEDURE — G8978 MOBILITY CURRENT STATUS: HCPCS

## 2017-11-09 PROCEDURE — G8979 MOBILITY GOAL STATUS: HCPCS

## 2017-11-09 PROCEDURE — 82947 ASSAY GLUCOSE BLOOD QUANT: CPT

## 2017-11-09 PROCEDURE — 51798 US URINE CAPACITY MEASURE: CPT

## 2017-11-09 PROCEDURE — 97166 OT EVAL MOD COMPLEX 45 MIN: CPT

## 2017-11-09 PROCEDURE — 2500000003 HC RX 250 WO HCPCS: Performed by: EMERGENCY MEDICINE

## 2017-11-09 PROCEDURE — 85025 COMPLETE CBC W/AUTO DIFF WBC: CPT

## 2017-11-09 PROCEDURE — G8988 SELF CARE GOAL STATUS: HCPCS

## 2017-11-09 PROCEDURE — 70551 MRI BRAIN STEM W/O DYE: CPT

## 2017-11-09 PROCEDURE — S0028 INJECTION, FAMOTIDINE, 20 MG: HCPCS | Performed by: EMERGENCY MEDICINE

## 2017-11-09 PROCEDURE — G8987 SELF CARE CURRENT STATUS: HCPCS

## 2017-11-09 PROCEDURE — 97530 THERAPEUTIC ACTIVITIES: CPT

## 2017-11-09 PROCEDURE — 6360000002 HC RX W HCPCS: Performed by: EMERGENCY MEDICINE

## 2017-11-09 PROCEDURE — 93005 ELECTROCARDIOGRAM TRACING: CPT

## 2017-11-09 PROCEDURE — 99291 CRITICAL CARE FIRST HOUR: CPT | Performed by: PSYCHIATRY & NEUROLOGY

## 2017-11-09 PROCEDURE — 36415 COLL VENOUS BLD VENIPUNCTURE: CPT

## 2017-11-09 PROCEDURE — 97162 PT EVAL MOD COMPLEX 30 MIN: CPT

## 2017-11-09 PROCEDURE — 2580000003 HC RX 258: Performed by: INTERNAL MEDICINE

## 2017-11-09 PROCEDURE — 6370000000 HC RX 637 (ALT 250 FOR IP): Performed by: PSYCHIATRY & NEUROLOGY

## 2017-11-09 PROCEDURE — 83036 HEMOGLOBIN GLYCOSYLATED A1C: CPT

## 2017-11-09 PROCEDURE — 99222 1ST HOSP IP/OBS MODERATE 55: CPT | Performed by: INTERNAL MEDICINE

## 2017-11-09 PROCEDURE — 99223 1ST HOSP IP/OBS HIGH 75: CPT | Performed by: PSYCHIATRY & NEUROLOGY

## 2017-11-09 PROCEDURE — 85610 PROTHROMBIN TIME: CPT

## 2017-11-09 PROCEDURE — 80048 BASIC METABOLIC PNL TOTAL CA: CPT

## 2017-11-09 RX ORDER — ASPIRIN 81 MG/1
81 TABLET, CHEWABLE ORAL DAILY
Status: DISCONTINUED | OUTPATIENT
Start: 2017-11-09 | End: 2017-11-10

## 2017-11-09 RX ORDER — HALOPERIDOL 5 MG/ML
5 INJECTION INTRAMUSCULAR ONCE
Status: COMPLETED | OUTPATIENT
Start: 2017-11-09 | End: 2017-11-09

## 2017-11-09 RX ADMIN — FAMOTIDINE 20 MG: 10 INJECTION, SOLUTION INTRAVENOUS at 09:17

## 2017-11-09 RX ADMIN — INSULIN LISPRO 1 UNITS: 100 INJECTION, SOLUTION INTRAVENOUS; SUBCUTANEOUS at 21:39

## 2017-11-09 RX ADMIN — METOPROLOL SUCCINATE 50 MG: 50 TABLET, FILM COATED, EXTENDED RELEASE ORAL at 09:17

## 2017-11-09 RX ADMIN — ASPIRIN 81 MG: 81 TABLET, CHEWABLE ORAL at 09:47

## 2017-11-09 RX ADMIN — FAMOTIDINE 20 MG: 10 INJECTION, SOLUTION INTRAVENOUS at 21:39

## 2017-11-09 RX ADMIN — AMLODIPINE BESYLATE 5 MG: 5 TABLET ORAL at 09:18

## 2017-11-09 RX ADMIN — HYDROCODONE BITARTRATE AND ACETAMINOPHEN 1 TABLET: 5; 325 TABLET ORAL at 13:57

## 2017-11-09 RX ADMIN — INSULIN LISPRO 2 UNITS: 100 INJECTION, SOLUTION INTRAVENOUS; SUBCUTANEOUS at 17:33

## 2017-11-09 RX ADMIN — INSULIN LISPRO 2 UNITS: 100 INJECTION, SOLUTION INTRAVENOUS; SUBCUTANEOUS at 09:36

## 2017-11-09 RX ADMIN — TAMSULOSIN HYDROCHLORIDE 0.4 MG: 0.4 CAPSULE ORAL at 21:38

## 2017-11-09 RX ADMIN — DOCUSATE SODIUM 100 MG: 100 CAPSULE ORAL at 21:38

## 2017-11-09 RX ADMIN — DOCUSATE SODIUM 100 MG: 100 CAPSULE ORAL at 09:18

## 2017-11-09 RX ADMIN — SODIUM CHLORIDE: 9 INJECTION, SOLUTION INTRAVENOUS at 05:54

## 2017-11-09 RX ADMIN — ATORVASTATIN CALCIUM 40 MG: 40 TABLET, FILM COATED ORAL at 21:38

## 2017-11-09 RX ADMIN — LISINOPRIL 20 MG: 20 TABLET ORAL at 09:18

## 2017-11-09 RX ADMIN — HALOPERIDOL LACTATE 5 MG: 5 INJECTION, SOLUTION INTRAMUSCULAR at 16:12

## 2017-11-09 ASSESSMENT — ENCOUNTER SYMPTOMS
COUGH: 0
BLOOD IN STOOL: 0
SPUTUM PRODUCTION: 0
BLURRED VISION: 0
BACK PAIN: 1
NAUSEA: 0
VOMITING: 0
DOUBLE VISION: 0

## 2017-11-09 ASSESSMENT — PAIN SCALES - GENERAL
PAINLEVEL_OUTOF10: 3
PAINLEVEL_OUTOF10: 0
PAINLEVEL_OUTOF10: 6
PAINLEVEL_OUTOF10: 0

## 2017-11-09 ASSESSMENT — PAIN DESCRIPTION - PROGRESSION: CLINICAL_PROGRESSION: GRADUALLY IMPROVING

## 2017-11-09 NOTE — CARE COORDINATION
PATIENT TRANSFERRED FROM [facility] TO [facility]    Patient was transferred from [facility] to [facility] yesterday, 11/8/17. Patient began to exhibit altered mental status which continued to worsen throughout the day. Scan revealed likely occipital stroke - therefore patient was transferred to [facility] 11/8/17 to have an angiogram performed.     Shanon Rubio, MSN, RN, CNL on 11/9/17 at 4:05 PM  Orthopedic Nurse Navigator - [facility] [address] JA MERINO  Phone: [phone] / Fax: [phone]

## 2017-11-09 NOTE — CONSULTS
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 male with PMH mentioned below had total L hip replacement at [address] on 11/7. Post op, patient was confused for which CT head was performed and showed acute/subacute L occipital lobe infarct. 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     Allergies  has No Known Allergies. Medications  Prior to Admission medications    Medication Sig Start Date End Date Taking? Authorizing Provider   docusate sodium (COLACE) 100 MG capsule Take 1 capsule by mouth 2 times daily 11/8/17   John Hadley MD   oxyCODONE-acetaminophen (PERCOCET) 5-325 MG per tablet Take 1-2 tablets by mouth every 4 hours as needed for Pain .  11/8/17 11/15/17  John Hadley MD   ondansetron [facility]) 4 MG tablet Take 1 tablet by mouth every 6 hours as needed for Nausea 11/8/17   John Hadley MD   apixaban Zamzam Nugent) 2.5 MG TABS tablet Take 1 tablet by mouth 2 times daily 11/8/17 12/8/17  John Hadley MD   metFORMIN (GLUCOPHAGE) 500 MG tablet Take 500 mg by mouth 2 times daily (with meals)    Historical Provider, MD   atorvastatin (LIPITOR) 40 MG tablet Take 40 mg by mouth nightly    Historical Provider, MD   gabapentin (NEURONTIN) 300 MG capsule Take 300 mg by mouth 2 times daily    Historical Provider, MD   amLODIPine (NORVASC) 5 MG tablet Take 5 mg by mouth daily    Historical Provider, MD   lisinopril (PRINIVIL;ZESTRIL) 20 MG tablet Take 20 mg by mouth 2 times daily    Historical Provider, MD   Cholecalciferol (VITAMIN D3 PO) Take 1 tablet by mouth 2 times daily    Historical Provider, MD   Insulin NPH Isophane & Regular (HUMULIN 70/30 PEN) (70-30) 100 UNIT per ML injection pen Inject 30 Units into the skin every morning    Historical Provider, MD   Insulin NPH Isophane & Regular (HUMULIN 70/30 PEN) (70-30) 100 UNIT per ML injection pen Inject 22 Units into the skin nightly    Historical Provider, MD   tamsulosin (FLOMAX) 0.4 MG capsule Take 0.4 mg by mouth nightly    Historical Provider, MD   metoprolol succinate (TOPROL XL) 50 MG extended release tablet Take 50 mg by mouth daily    Historical Provider, MD    Scheduled Meds:   famotidine (PEPCID) injection  20 mg Intravenous BID    aspirin  81 mg Oral Daily    docusate sodium  100 mg Oral BID    sodium chloride flush  10 mL Intravenous 2 times per day    amLODIPine  5 mg Oral Daily    atorvastatin  40 mg Oral Nightly    insulin lispro  0-12 Units Subcutaneous TID WC    insulin lispro  0-6 Units Subcutaneous Nightly    lisinopril  20 mg Oral BID    metoprolol succinate  50 mg Oral Daily    tamsulosin  0.4 mg Oral Nightly    labetalol  10 mg Intravenous Once     Continuous Infusions:   sodium chloride 125 mL/hr at 11/09/17 0554    dextrose       PRN Meds:.acetaminophen, ondansetron, sodium chloride flush, dextrose, dextrose, glucagon (rDNA), glucose, HYDROcodone 5 mg - acetaminophen, hydrOXYzine, LORazepam, sodium chloride flush  Past Medical History   has a past medical history of Arthritis; Diabetes ([address] Hearing aid worn; [place] (hard of hearing); Hyperlipidemia; and Hypertension. Past Surgical History   has a past surgical history that includes Neck surgery; back surgery; Cholecystectomy; Cataract removal (Right, 11/2016); and Total hip arthroplasty (Left, 11/07/2017). Social History   reports that he has never smoked. He has never used smokeless tobacco.   reports that he drinks alcohol. reports that he does not use drugs. Family History  family history is not on file.     Review of Systems:   CONSTITUTIONAL:  negative for fevers    EYES:

## 2017-11-09 NOTE — PROGRESS NOTES
Clinical Pharmacy Note  Metformin-IV Contrast Interaction Monitoring  Serum Creatinine Follow-up    Karla Carlson is a 67 y.o. male     Recent Labs      11/07/17   1533  11/08/17   0756   CREATININE  0.77  0.66*     Estimated Creatinine Clearance: 113 mL/min (based on SCr of 0.66 mg/dL). It is recommended that METFORMIN be temporarily discontinued in patients undergoing radiologic studies in which intravascular iodinated contrast media are utilized. DATE:  Iopamidol given on 11/8 at  1756 pm.  Authorizing Physician for original order: Dr. Isma Vieira    Assessment/Plan:  1. Metformin has been held > 48 hours from time of IV contrast dye administration. 2.  Will re-evaluate GFR in 48 hours and restart if appropriate. If eGFR is greater than 30, it should be safe to resume metformin. Note: previous  recommendations stated the following as safe to administer: sCr  < 1.5 mg/dL in males, or < 1.4 mg/dL in females.       Harleen Hauser  11/8/2017  6:39 PM
Dr Christine present in patient room; family members present.
Dr Stephanie Allen to the patient room; the patient's wife is present.
Dr. Betzy Cisse notified of CT results. Await CTA of head and neck.  Dr. Christine in with pt and family
Dr. Tom Ceballos notified of pt plan of transfer to [facility].  Leave [facility] on x2 weeks and ambulate when ok with others with no restrictions
Occupational Therapy  DATE: 2017    NAME: Denys Shields  MRN: [id number]   : 17 1106   Restrictions/Precautions   Restrictions/Precautions Weight Bearing   Lower Extremity Weight Bearing Restrictions   Left Lower Extremity Weight Bearing Weight Bearing As Tolerated   Pain Assessment   Patient Currently in Pain Unable to Assess   Oxygen Therapy   O2 Device None (Room air)   Orientation   Overall Orientation Status X   Orientation Level Oriented to person;Disoriented to place; Disoriented to time;Oriented to situation  (Could not state year or month or place)   Attendance   Participation Active participation   ADL   Feeding Verbal cueing;Stand by assistance;Contact guard assistance;Setup  (tactile cues)   Toileting Maximum assistance   Additional Comments Pt requiring max VC for all tasks, pt demo ataxic UE movements, also reaching for objects that are not there. Pt attempting to brush his teeth and complete toileting, unable to sequence task, requiring [place], tactile, and VC for all components of all ADL tasks, transfers and mobility. Pt demo and verbalize confusion AEB calling objects the wrong name, unable to identify common objects such as a toothbrush and fork.     Balance   Sitting Balance Contact guard assistance   Standing Balance Minimal assistance   Standing Balance   Sit to stand Minimal assistance   Stand to sit Minimal assistance   Comment MAX cueing throughout   Functional Mobility   Functional - Mobility Device Rolling Walker   Activity To/from bathroom   Assist Level Maximum assistance   Functional Mobility Comments pt needing assist to move walker and max cues for sequencingof all mobility tasks   Bed mobility   Supine to Sit Moderate assistance   Scooting Contact guard assistance   Comment VC for hand placment and safety   Toilet Transfers   Toilet - Technique Ambulating   Equipment Used Grab bars   Toilet Transfer Maximum assistance   Cognition   Overall Cognitive Status
Patient being [facility] Family at bedside. Patient stable at this time.
Physical Therapy  Lenny Anguiano      11/08/17 0877   Restrictions/Precautions   Restrictions/Precautions Weight Bearing   Lower Extremity Weight Bearing Restrictions   Left Lower Extremity Weight Bearing Weight Bearing As Tolerated   General   Chart Reviewed Yes   Family / Caregiver Present Yes   Subjective   Subjective Patient is very confused and is calling out, saying incoherent words and comments. General Comment   Comments RN patient is medically stable for PT treatment. Pain Screening   Patient Currently in Pain Unable to Assess   Oxygen Therapy   O2 Device None (Room air)   Bed Mobility   Rolling Moderate assistance   Scooting Maximal assistance  (x 2 assist for scooting in bed)   Transfers   Comment Patient is not appropraite for sit<> stand transfer at this time and will be going to CT scan soon. Ambulation   Ambulation? No   Exercises   Comments Supine diana LE PROM x 15 reps with rest breaks as needed. Exercise is PROM d/t patient unable to follow commands, VCs or tactile cues to perform exercises. Other Activities   Comment Spoke with RN for some time as she would like patient to stand to attempt to have his urinate. Explain to RN that is currently no appropriate d/t increase confusion w/ patient not able to follow any commands. Spend time with family to understands patient's baseline. Family reports that he normally is oriented to person, place and situation. He was using a RW prior to the surgery d/t pain with amb and did need assistance with LE dressing but was taking showers independently. Patient Goals    Patient goals  Did not state at this time- see above comments   Short term goals   Time Frame for Short term goals 12 visits:   Short term goal 1 Pt. to be indep with bed mob. Short term goal 2 Pt. to be indep with transfers   Short term goal 3 Pt. to be indep with gait with RW, 200 ft. Short term goal 4 Pt. to tolerate 25+ min.  of PT BID for CHRISTIANO ther ex, ant. precautions,
The patient returns to the room from CTA; Br Blood on the unit and was notified by the writer.
Limitation: Self care  Self Care Current Status (): At least 40 percent but less than 60 percent impaired, limited or restricted  OutComes Score                                           Goals  Short term goals  Time Frame for Short term goals: by discharge, pt will  Short term goal 1: demo SBA with ADL transfers with good safety  Short term goal 2: demo SBA with functional mob for ADL completion with good safety  Short term goal 3: demo SBA with toileting routine  Short term goal 4: demo SBA UB ADLs and min A LB ADLs  Short term goal 5: verb good understanding of EC/WS techs and fall prevention techs to use at home  Patient Goals   Patient goals : not stated       Therapy Time   Individual Concurrent Group Co-treatment   Time In 1409         Time Out 1444         Minutes 35           Pt would benefit from skilled home OT services in order to maximize occupational performance, safety, and independence in the home environment.         Adal Fernando, OT

## 2017-11-09 NOTE — PROGRESS NOTES
Physical Therapy    Facility/Department: [address]  Initial Assessment    NAME: Chon Jackson  : 1945  MRN: [id number]    Date of Service: 2017  Patient is s/p L CHRISTIANO anterior approach- DOS: 17    History obtained from medical record:  Briefly, this is a  67 y.o. male with history of HTN, DM, HLD admitted on 2017 with L occipital infarct. Patient had total L hip replacement at [address] on . Per charting, the nurse reported that he had some episodes of confusion the morning of , but was still oriented x3. Due to persistent confusion throughout the day, Internal Medicine was consulted. Due to the fact that he had worsening confusion, as well as hallucinations, a CT head was performed which showed moderate size  acute/subacute L occipital lobe infarct, possible tiny petechial hemorrhage vs. Calcification, otherwise unremarkable study. Dr. Sonia Tang of neuroendovascular was consulted and ordered CTA head and neck. CTA's show severe stenosis distal V4 segments in the vertebral arteries bilaterally due to mixed plaque. Dr. Sonia Tang informed of results and patient was transferred to [facility] with plan for diagnostic angio and MRI Brain; these studies have not yet been done. There is no apparent history for a cardioembolic source. He came to this hospital as a transfer Eliquis 2.5 mg twice a day; an office H and P from 2017 makes no mention of anticoagulation medication nor any reason to be on that medication. It appears that this has been started by the orthopedic service at [address] [place] [address] as DVT prophylaxis on account of the THR. Patient Diagnosis(es): There were no encounter diagnoses. has a past medical history of Arthritis; Diabetes ([address] Hearing aid worn; [place] (hard of hearing); Hyperlipidemia; and Hypertension. has a past surgical history that includes Neck surgery; back surgery; Cholecystectomy;  Cataract removal (Right, 2016); and Total hip arthroplasty (degrees)  LLE AROM : WFL  AROM RUE (degrees)  RUE AROM : WFL  AROM LUE (degrees)  LUE AROM : WFL  Strength RLE  Strength RLE: WFL  Comment: hip flx 4-/5, rest grossly 4+/5  Strength LLE  Strength LLE: WFL  Comment: ankle dorsi/plantar 4+/5  Strength RUE  Strength RUE: WFL (Assessed by OT)  Strength LUE  Strength LUE: WFL (Assessed by OT)  Tone RLE  RLE Tone: Normotonic  Tone LLE  LLE Tone: Normotonic  Motor Control  Gross Motor?: WFL  Sensation  Overall Sensation Status: WFL  Bed mobility  Rolling to Left: Minimal assistance  Rolling to Right: Minimal assistance  Supine to Sit: Moderate assistance (modAx2)  Sit to Supine: Moderate assistance (modAx2)  Scooting: Contact guard assistance  Comment: While supine in bed, pt's /70  Transfers  Sit to Stand: Minimal Assistance  Stand to sit: Minimal Assistance  Comment: sit-stand and stand-sit completed in [facility] stedy. Upon standing pt's BP 85/79, pt reported no symptoms of dizziness, upon positioning back supine pt's /58. Patient reported his pain was 5-6/10 while standing  Ambulation  Ambulation?: No (Ambulation not attemped due to BP reading)     Balance  Posture: Fair  Sitting - Static: Fair;+  Sitting - Dynamic: Fair  Standing - Static: Fair;+  Standing - Dynamic: Fair  Comments: Patient shifting weight onto RLE. Patient required cueing on shifting weight        Assessment   Body structures, Functions, Activity limitations: Decreased functional mobility ; Decreased ADL status; Decreased ROM; Decreased strength;Decreased safe awareness;Decreased cognition;Decreased endurance;Decreased balance;Decreased coordination;Decreased fine motor control  Assessment: Patient most limited by cognitive status. Patient was CGA-modAx2 for bed mobility, Evelina for functional transfers, and ambulation was not assessed at this time due to BP reading.  Patient would benefit from acute PT at this time in order to increase strength and exercise tolerance as well as decrease the assistance for functional mobility (Assessment completed w/ OT)  Prognosis: Good  Decision Making: Medium Complexity  REQUIRES PT FOLLOW UP: Yes  Activity Tolerance  Activity Tolerance: Patient limited by fatigue;Patient limited by cognitive status; Patient limited by pain     Discharge Recommendations:  [address]  Times per week: 2x/day - 7days/wk  Times per day: Twice a day  Current Treatment Recommendations: Strengthening, ROM, Balance Training, Functional Mobility Training, Transfer Training, ADL/Self-care Training, Gait Training, Endurance Training, Neuromuscular Re-education  Safety Devices  Type of devices: Gait belt, Left in bed, Nurse notified, Call light within reach (Left in room w/ OT)    G-Code  PT G-Codes  Functional Assessment Tool Used: [place] tool  Score: 11  Mobility: Walking and Moving Around Current Status (): At least 60 percent but less than 80 percent impaired, limited or restricted  Mobility: Walking and Moving Around Goal Status (): At least 40 percent but less than 60 percent impaired, limited or restricted    Goals  Short term goals  Time Frame for Short term goals: 14 visits  Short term goal 1: Patient will complete all bed mobility tasks w/ SBA  Short term goal 2: Patient will complete functional transfers w/ SBA  Short term goal 3: Patient will ambulate 50ft w/ least restrictive device w/ Evelina  Short term goal 4: Patient will tolerate 20+ min of therapeutic exercise/activity w/ therapy BID       Therapy Time   Individual Concurrent Group Co-treatment   Time In 0830         Time Out 0905         Minutes 28                 YON[place]AN [place]  Evaluation/treatment performed by Student PT under the supervision of co-signing PT who agrees with all evaluation/treatment and documentation.

## 2017-11-09 NOTE — H&P
Age of Onset    Stroke Mother     Heart Attack Mother     Stroke Father        Review of Systems:     Positive and Negative as described in HPI. Review of Systems   Constitutional: Negative for chills and fever. HENT: Positive for hearing loss (has hearing aid). Eyes: Negative for blurred vision and double vision. Respiratory: Negative for cough and sputum production. Cardiovascular: Negative for chest pain and palpitations. Gastrointestinal: Negative for blood in stool, melena, nausea and vomiting. Genitourinary: Negative for flank pain and hematuria. History of hesitancy   Musculoskeletal: Positive for back pain, joint pain and myalgias. The patient has chronic arthralgias and myalgias    Postop hip pain is controlled   Skin: Negative for rash. Neurological: Negative for dizziness, sensory change, speech change, focal weakness, seizures and headaches. Psychiatric/Behavioral: Positive for memory loss. Physical Exam:   BP (!) 161/56   Pulse 90   Temp 99 °F (37.2 °C) (Oral)   Resp 17   Ht 6' 1\" (1.854 m)   Wt 204 lb 2.3 oz (92.6 kg)   SpO2 100%   BMI 26.93 kg/m²   Temp (24hrs), Av °F (37.2 °C), Min:98.4 °F (36.9 °C), Max:99.3 °F (37.4 °C)    Recent Labs      17   1650  17   2235  17   0911  17   1135   POCGLU  169*  174*  154*  142*       Intake/Output Summary (Last 24 hours) at 17 1409  Last data filed at 17 1308   Gross per 24 hour   Intake             1171 ml   Output             1975 ml   Net             -804 ml       Physical Exam   Constitutional: No distress. HENT:   Head: Normocephalic and atraumatic. Eyes: Conjunctivae are normal. No scleral icterus. Neck: Neck supple. Cardiovascular: Normal rate and regular rhythm. Pulmonary/Chest: Effort normal and breath sounds normal.   Abdominal: Soft. Bowel sounds are normal. He exhibits no distension. There is no tenderness.    Musculoskeletal: He exhibits tenderness (hip wound). Lymphadenopathy:     He has no cervical adenopathy. Neurological: He is alert. The patient is having trouble providing historical data  Moving extremities ×4   equal  Speech is fluent   Skin: Skin is warm and dry. He is not diaphoretic. Structural Examination:   No scoliosis  No kyphosis  Chronic paraspinal reactivity  ROM is decreased    Investigations:      Laboratory Testing:  Recent Results (from the past 24 hour(s))   POC Glucose Fingerstick    Collection Time: 11/08/17  4:50 PM   Result Value Ref Range    POC Glucose 169 (H) 75 - 110 mg/dL   MRSA DNA Probe, Nasal    Collection Time: 11/08/17  9:40 PM   Result Value Ref Range    Specimen Description . NASAL SWAB     MRSA, DNA, Nasal       NEGATIVE:  MRSA DNA not detected by nucleic acid amplification.    POC Glucose Fingerstick    Collection Time: 11/08/17 10:35 PM   Result Value Ref Range    POC Glucose 174 (H) 75 - 110 mg/dL   PROTIME-INR    Collection Time: 11/09/17  6:02 AM   Result Value Ref Range    Protime 11.9 9.4 - 12.6 sec    INR 1.1    APTT    Collection Time: 11/09/17  6:02 AM   Result Value Ref Range    PTT 28.0 21.3 - 31.3 sec   CBC WITH AUTO DIFFERENTIAL    Collection Time: 11/09/17  6:02 AM   Result Value Ref Range    WBC 10.7 3.5 - 11.3 k/uL    RBC 3.48 (L) 4.21 - 5.77 m/uL    Hemoglobin 10.3 (L) 13.0 - 17.0 g/dL    Hematocrit 31.9 (L) 40.7 - 50.3 %    MCV 91.7 82.6 - 102.9 fL    MCH 29.6 25.2 - 33.5 pg    MCHC 32.3 28.4 - 34.8 g/dL    RDW 12.1 11.8 - 14.4 %    Platelets 601 847 - 719 k/uL    MPV 9.6 8.1 - 13.5 fL    Differential Type NOT REPORTED     WBC Morphology NOT REPORTED     RBC Morphology NOT REPORTED     Platelet Estimate NOT REPORTED     Seg Neutrophils 79 (H) 36 - 65 %    Lymphocytes 11 (L) 24 - 43 %    Monocytes 9 3 - 12 %    Eosinophils % 0 (L) 1 - 4 %    Basophils 0 0 - 2 %    Immature Granulocytes 1 (H) 0 %    Segs Absolute 8.42 (H) 1.50 - 8.10 k/uL    Absolute Lymph # 1.18 1.10 - 3.70 k/uL Absolute Mono # 1.00 0.10 - 1.20 k/uL    Absolute Eos # <0.03 0.00 - 0.44 k/uL    Basophils # 0.04 0.00 - 0.20 k/uL    Absolute Immature Granulocyte 0.05 0.00 - 0.30 k/uL   BASIC METABOLIC PANEL    Collection Time: 11/09/17  6:02 AM   Result Value Ref Range    Glucose 149 (H) 70 - 99 mg/dL    BUN 9 8 - 23 mg/dL    CREATININE 0.60 (L) 0.70 - 1.20 mg/dL    Bun/Cre Ratio NOT REPORTED 9 - 20    Calcium 8.3 (L) 8.6 - 10.4 mg/dL    Sodium 133 (L) 135 - 144 mmol/L    Potassium 3.7 3.7 - 5.3 mmol/L    Chloride 100 98 - 107 mmol/L    CO2 24 20 - 31 mmol/L    Anion Gap 9 9 - 17 mmol/L    GFR Non-African American >60 >60 mL/min    GFR African American >60 >60 mL/min    GFR Comment          GFR Staging NOT REPORTED    Hemoglobin A1C    Collection Time: 11/09/17  6:02 AM   Result Value Ref Range    Hemoglobin A1C 6.0 4.0 - 6.0 %    Estimated Avg Glucose 126 mg/dL   Lipid Panel    Collection Time: 11/09/17  6:02 AM   Result Value Ref Range    Cholesterol 98 <200 mg/dL    HDL 36 (L) >40 mg/dL    LDL Cholesterol 48 0 - 130 mg/dL    Chol/HDL Ratio 2.7 <5    Triglycerides 71 <150 mg/dL    VLDL NOT REPORTED 1 - 30 mg/dL   POC Glucose Fingerstick    Collection Time: 11/09/17  9:11 AM   Result Value Ref Range    POC Glucose 154 (H) 75 - 110 mg/dL   POC Glucose Fingerstick    Collection Time: 11/09/17 11:35 AM   Result Value Ref Range    POC Glucose 142 (H) 75 - 110 mg/dL       Imaging/Diagnostics:  MRI  Impression:        Multifocal recent infarcts, largest in the left occipital pole. See above  for details. Curvilinear dark gradient echo signal in the left occipital region raising  the question of a small amount of hemorrhage.            CTA  Moderate to severe stenosis origin of the right vertebral artery and distal   V4 segments bilaterally.           Assessment :      Principal Problem:    Occipital infarction [facility]  Active Problems:    Controlled type 2 diabetes mellitus without complication, with long-term current use of

## 2017-11-09 NOTE — CARE COORDINATION
Case Management Initial Discharge Plan  Shashank Reyes,         Readmission Risk              Readmission Risk:        9.5       Age 72 or Greater:  1    Admitted from SNF or Requires Paid or Family Care:  0    Currently has CHF,COPD,ARF,CRI,or is on dialysis:  0    Takes more than 5 Prescription Medications:  4    Takes Digoxin,Insulin,Anticoagulants,Narcotics or ASA/Plavix:  [address] in Past 12 Months:  0    On Disability:  0    Patient Considers own Health:  2.5            Met with:spouse/SO Sera Gentile, patient, and sister Laura Rivera to discuss discharge plans. Information verified: address, contacts, phone number, , insurance Yes  PCP: Rinda Lombard, MD  Date of last visit: 3/28    Insurance Provider: [place]    Discharge Planning  Current Residence:  Private Residence  Living Arrangements:  Spouse/Significant Other   Home has 1 story/3-4 stairs to climb  Support Systems:  Spouse/Significant Other  Current Services PTA:    Supplier:   Patient able to perform ADL's:Independent  DME used to aid ambulation prior to admission: cane/during admission bed    Potential Assistance Needed:  Emergency Call  System, [facility]:    Potential Assistance Purchasing Medications:     Does patient want to participate in local refill/ meds to beds program?       Patient agreeable to home care: Yes  Freedom of choice provided:  yes      Type of Home Care Services:  Nursing Services, OT, PT  Patient expects to be discharged to:  SNF    Prior SNF/Rehab Placement and Facility:   Agreeable to SNF/Rehab: Yes  [place] of choice provided: yes   Evaluation: no    Expected Discharge date: Follow Up Appointment: Best Day/ Time:      Transportation provider: Michael Harris  Transportation arrangements needed for discharge: Yes    Discharge Plan: Pt's spouse prefers [address] as first choice SNF and [facility] as second choice.   This is opposite original plan as spouse changed mind. Referrals sent to both SNFs. [address] referral also for following pt at SNF for continuity of care onced [address] there.         Electronically signed by Lisa Hazel RN on 11/9/17 at 5:50 PM

## 2017-11-09 NOTE — CONSULTS
NEUROLOGY INPATIENT CONSULT NOTE    11/9/2017         Lulu Anand is a  67 y.o. male admitted on 11/8/2017 with  Stroke determined by clinical assessment [facility]) [I63.9]      History is obtained mostly from the patient and the medical record and from the caregivers. Chart is reviewed and patient is examined. Briefly, this is a  67 y.o. male with history of HTN, DM, HLD admitted on 11/8/2017 with L occipital infarct. Patient had total L hip replacement at [address] on 11/7. Per charting, the nurse reported that he had some episodes of confusion the morning of 11/8, but was still oriented x3. Due to persistent confusion throughout the day, Internal Medicine was consulted. Due to the fact that he had worsening confusion, as well as hallucinations, a CT head was performed which showed moderate size  acute/subacute L occipital lobe infarct, possible tiny petechial hemorrhage vs. Calcification, otherwise unremarkable study. Dr. Nic Parnell of neuroendovascular was consulted and ordered CTA head and neck. CTA's show severe stenosis distal V4 segments in the vertebral arteries bilaterally due to mixed plaque. Dr. Nic Parnell informed of results and patient was transferred to [facility] with plan for diagnostic angio and MRI Brain; these studies have not yet been done. There is no apparent history for a cardioembolic source. He came to this hospital as a transfer Eliquis 2.5 mg twice a day; an office H and P from October 26, 2017 makes no mention of anticoagulation medication nor any reason to be on that medication. It appears that this has been started by the orthopedic service at [address] [place] [address] as DVT prophylaxis on account of the THR. Meanwhile, the patient's wife states that he is much more clearheaded today.            No current facility-administered medications on file prior to encounter.       Current Outpatient Prescriptions on File Prior to Encounter   Medication Sig Dispense Refill    docusate sodium (COLACE) parameter disturbance verbally. Some inattentiveness, some difficulty following complex commands, follows simple commands well. CRANIAL NERVES: II     -       dense RHH  III,IV,VI - PRRRE without nystagmus; EOMs full, no afferent defect, no                      RAYNA, no ptosis  V     -     Normal facial sensation  VII    -     Normal facial symmetry  VIII   -     Intact hearing  IX,X -     Symmetrical palate, could swallow  XI    -     Symmetrical shoulder shrug  XII   -     Midline tongue, no atrophy    MOTOR FUNCTION:  Normal bulk, normal tone, except that strength testing in proximal lower extremities is limited due to left hip pain, otherwise normal power;  no involuntary movements, no tremor   SENSORY FUNCTION:  Normal touch, normal pain grossly    CEREBELLAR FUNCTION:  no upper extremity limb cerebellar signs    REFLEX FUNCTION:  Symmetric, 1/4, plantars flexor    STATION and GAIT  Not tested         Data:    Lab Results:     Lab Results   Component Value Date    CHOL 168 03/07/2016    LDLCHOLESTEROL 101 03/07/2016    HDL 46 03/07/2016    TRIG 107 03/07/2016    ALT 20 03/07/2016    AST 23 03/07/2016    TSH 1.79 03/07/2016    INR 1.1 11/09/2017    LABA1C 5.8 08/27/2012    LABMICR 6 08/27/2012     Hematology:  Recent Labs      11/08/17   0756  11/09/17   0602   WBC  11.9*  10.7   HGB  12.0*  10.3*   HCT  35.8*  31.9*   PLT  256  191   INR   --   1.1     Chemistry:  Recent Labs      11/07/17   1533  11/08/17   0756  11/09/17   0602   NA   --   136  133*   K   --   4.0  3.7   CL   --   99  100   CO2   --   28  24   GLUCOSE   --   179*  149*   BUN  12  13  9   CREATININE  0.77  0.66*  0.60*   CALCIUM   --   8.4*  8.3*     No results for input(s): PROT, LABALBU, LABA1C, C7QCOOG, A5APCUH, FT4, TSH, AST, ALT, LDH, AMMONIA, CHOL, HDL, LDLCHOLESTEROL, CHOLHDLRATIO, TRIG, VLDL, CKTOTAL, CKMB, CKMBINDEX, RF, DIGNA in the last 72 hours.           Diagnostic data reviewed:  Cta Head W Wo Contrast    Result Date: HISTORY: ORDERING SYSTEM PROVIDED HISTORY: PAT TECHNOLOGIST PROVIDED HISTORY: Patient is in pre-admission testing. Reason for exam:->PAT Ordering Physician Provided Reason for Exam: pre op Acuity: Unknown Type of Exam: Unknown Preoperative clearance for hip surgery in a patient with multiple comorbidities to include diabetes, hypertension and hyperlipidemia. FINDINGS: The heart is not enlarged. No significant pulmonary edema. Mild elevation of the right hemidiaphragm is unchanged. No focal lung consolidation or infiltrate. No pleural effusion or pneumothorax. Right upper quadrant surgical clips indicate prior cholecystectomy. Stable exam without acute cardiopulmonary process. Xr Pelvis (1-2 Views)    Result Date: 11/7/2017  EXAMINATION: 2 VIEWS OF THE LEFT HIP; SINGLE VIEW OF THE PELVIS 11/7/2017 11:46 am COMPARISON: Intraoperative images from earlier today HISTORY: ORDERING SYSTEM PROVIDED HISTORY: left CHRISTIANO TECHNOLOGIST PROVIDED HISTORY: Of operative side while in recovery room. Reason for exam:->left CHRISTIANO Ordering Physician Provided Reason for Exam: post op total Acuity: Unknown Type of Exam: Unknown FINDINGS: Postop changes from left total hip arthroplasty are present. Prosthetic components are aligned. There is no evidence of periprosthetic fracture. Remainder of the bony pelvis appears intact. Mild degenerative changes are noted in the right hip, partially imaged. There is degenerative change in the lower lumbar spine, partially imaged. Small phleboliths are noted. Vascular calcifications are seen. A Bowers catheter is visualized. Soft tissue gas is seen related to recent surgery. Status post left hip arthroplasty without evidence of acute osseous or hardware complication. Mild right hip osteoarthrosis. Xr Hip Left (2-3 Views)    Result Date: 11/7/2017  EXAMINATION: SPOT FLUOROSCOPIC IMAGES 11/7/2017 10:36 am TECHNIQUE: Fluoroscopy was provided by the radiology department for procedure. Radiologist was not present during examination. FLUOROSCOPY DOSE AND TYPE OR TIME AND EXPOSURES: 53.8 seconds, 12.63 mGy, 8 fluoroscopic images COMPARISON: Left hip plain radiographs from 08/04/2010 HISTORY: Intraprocedural imaging. 48-year-old male receiving total left hip arthroplasty FINDINGS: Intraoperative fluoroscopy of the left hipwas performed. 8 fluoroscopic spot images were obtained during the procedure. A radiologist was not present at the time of image acquisition. The exam is therefore limited and was strictly for intraoperative guidance. Fluoroscopic time utilized was 53.8 seconds. Surgical instrumentation and retraction devices project over the native left hip on the initial image. There is severe left hip osteoarthrosis. On the following images, there is subsequent placement of a total left hip arthroplasty device and associated surgical instrumentation. The left femoral head projects centrally over the left acetabular component. Multifocal soft tissue gas is seen surrounding the left hip joint space and left thigh. Intraprocedural fluoroscopic spot images as above during placement of total left hip arthroplasty. See separate procedural/operative report for more information. Xr Hip Left (2-3 Views)    Result Date: 11/7/2017  EXAMINATION: 2 VIEWS OF THE LEFT HIP; SINGLE VIEW OF THE PELVIS 11/7/2017 11:46 am COMPARISON: Intraoperative images from earlier today HISTORY: ORDERING SYSTEM PROVIDED HISTORY: left CHRISTIANO TECHNOLOGIST PROVIDED HISTORY: Of operative side while in recovery room. Reason for exam:->left CHRISTIANO Ordering Physician Provided Reason for Exam: post op total Acuity: Unknown Type of Exam: Unknown FINDINGS: Postop changes from left total hip arthroplasty are present. Prosthetic components are aligned. There is no evidence of periprosthetic fracture. Remainder of the bony pelvis appears intact. Mild degenerative changes are noted in the right hip, partially imaged.   There is soft tissues. Acute/subacute left occipital lobe infarct. Possible tiny petechial hemorrhage versus calcification. Findings were discussed with Dr. Christine At 4:01 pm on 11/8/2017. Cta Neck W Wo Contrast    Result Date: 11/8/2017  EXAMINATION: CTA OF THE NECK 11/8/2017 6:05 pm TECHNIQUE: CTA of the neck was performed with the administration of intravenous contrast. Multiplanar reformatted images are provided for review. MIP images are provided for review. Stenosis of the internal carotid arteries measured using NASCET criteria. Dose modulation, iterative reconstruction, and/or weight based adjustment of the mA/kV was utilized to reduce the radiation dose to as low as reasonably achievable. COMPARISON: None. HISTORY: ORDERING SYSTEM PROVIDED HISTORY: acute cva FINDINGS: AORTIC ARCH/GREAT VESSELS: There is a normal branch pattern of the aortic arch. No significant stenosis is seen of the innominate artery or subclavian arteries. CAROTID ARTERIES: The common carotid arteries are normal in appearance without evidence of a flow limiting stenosis. The internal carotid arteries are normal in appearance without evidence of a flow limiting stenosis by NASCET criteria. No dissection or arterial injury is seen. Minimal plaque in the bulb on the left. VERTEBRAL ARTERIES: There is focal calcific plaque causing at least moderate stenosis at the origin of the right vertebral artery. There is moderate stenosis in the V4 segments bilaterally due to mixed plaque. SOFT TISSUES:  The lung apices are clear. No cervical or superior mediastinal lymphadenopathy. The visualized portion of the larynx and pharynx appear unremarkable. The parotid, submandibular and  thyroid glands demonstrate no acute abnormality. BONES: There is moderate levoconvex scoliosis. There are laminectomies at C3 and C4. Moderate spondylosis and multilevel degenerative disc disease.      Moderate to severe stenosis origin of the right vertebral artery

## 2017-11-09 NOTE — PROGRESS NOTES
Physical Therapy  [address]  Daily Treatment Note  NAME: Lizeth Guerrero  : 1945  MRN: [id number]    Date of Service: 2017    Patient Diagnosis(es):   Patient Active Problem List    Diagnosis Date Noted    Occipital infarction [facility]) 2017    Stenosis of right vertebral artery 2017    Hypertension     Acute metabolic encephalopathy     Arthralgia of left hip 2017    Controlled type 2 diabetes mellitus without complication, with long-term current use of insulin ([address] 2017    Essential hypertension 2017    Dyslipidemia 2017       Past Medical History:   Diagnosis Date    Arthritis     Diabetes ([address]     Hearing aid worn     bilateral    [place] (hard of hearing)     Wears bilateral Hearing Aids    Hyperlipidemia     Hypertension     [facility] 2017     Past Surgical History:   Procedure Laterality Date    BACK SURGERY      lumbar spine    CATARACT REMOVAL Right 2016    CHOLECYSTECTOMY      NECK SURGERY      TOTAL HIP ARTHROPLASTY Left 2017    LEFT HIP TOTAL ARTHROPLASTY ANTERIOR APPROACH     TOTAL HIP ARTHROPLASTY Left 2017    LEFT HIP TOTAL ARTHROPLASTY ANTERIOR APPROACH - BIOMET (TANMAY) MARIA LUISA performed by John Hadley MD at [address] [facility]  Restrictions/Precautions  Restrictions/Precautions: Weight Bearing, General Precautions  Required Braces or Orthoses?: No  Lower Extremity Weight Bearing Restrictions  Left Lower Extremity Weight Bearing: Weight Bearing As Tolerated  Position Activity Restriction  Other position/activity restrictions: Anterior approach CHRISTIANO - no straight leg raises  Subjective   General  Family / Caregiver Present: Yes (wife)  Subjective  Subjective: Pt continues to be very disoriented and confused, however pleasant.  Trey pain at rest.   Pain Screening  Patient Currently in Pain: Denies  Vital Signs  Patient Currently in Pain: Denies therapeutic exercise/activity w/ therapy BID    Plan    Plan  Times per week: BID (CHRISTIANO)  Times per day: Twice a day  Current Treatment Recommendations: Strengthening, ROM, Balance Training, Functional Mobility Training, Transfer Training, ADL/Self-care Training, Gait Training, Endurance Training, Neuromuscular Re-education  Safety Devices  Type of devices: Gait belt, Left in bed, Nurse notified, Call light within reach, Bed alarm in place, Telesitter in use     Therapy Time   Individual Concurrent Group Co-treatment   Time In 1506         Time Out 1530         Minutes 24                 Daniel Casarez PT

## 2017-11-09 NOTE — FLOWSHEET NOTE
Stopped to see pt while rounding on NSICU. Noted that pt is a direct admit transfer from [facility] for treatment of stroke reportedly found via CT scan.  approached pt & he seemed to accept 's presence. He tried to answer when  asked about why he is here, but was hard to make out what he was saying.  asked if he would like prayer, & he said he would accept \"prayer. \"   asked about his Mandaen connection & he could not answer and began to look confused. He reached out his right hand trying to touch . When  realized that pt was seeming to become confused he held pt's hand & offered prayer.  also left Spiritual Care info materials on one of the tables in the room. Chaplains will remain available to offer spiritual and emotional support as needed. Rev. Peng Postal, [address] [place] [address] Road     11/08/17 2045   Encounter Summary   Services provided to: Patient   Referral/Consult From: Rounding   Support System Spouse; Family members   Place of [address] [place] [address]. [employer]   Continue Visiting (11/8)   Complexity of Encounter Low   Length of Encounter 15 minutes   Routine   Type Initial   Assessment Approachable; Anxious; Coping   Intervention Sustaining presence/ Ministry of presence; Active listening;Prayer  (left Spiritual Care info in pt's room)   Outcome Receptive; Acceptance;Comfort   Spiritual/Faith   Type Spiritual support

## 2017-11-09 NOTE — PROGRESS NOTES
mastoid air cells demonstrate no acute abnormality. SOFT TISSUES/SKULL: No acute abnormality of the visualized skull or soft tissues. CTA HEAD: ANTERIOR CIRCULATION: There is moderate plaque causing moderate stenosis in the cavernous segments of the internal carotid arteries bilaterally. The anterior and middle cerebral arteries appear normal.  The anterior communicating artery is not well visualized. POSTERIOR CIRCULATION: There appears to be severe stenosis in the distal V4 segments of the vertebral arteries bilaterally due to mixed plaque. There is a posterior communicating artery present on the right but not the left. ANEURYSM: No intracranial aneurysm is seen. 1. No acute intracranial abnormality. 2. Severe stenosis distal V4 segments in the vertebral arteries bilaterally due to mixed plaque. RECOMMENDATIONS: Sensitivity is limited without 3D reformats. An addendum will be performed when these are available. Ct Head Wo Contrast    Result Date: 11/8/2017  EXAMINATION: CT OF THE HEAD WITHOUT CONTRAST - STROKE ALERT  11/8/2017 3:38 pm TECHNIQUE: CT of the head was performed without the administration of intravenous contrast. Dose modulation, iterative reconstruction, and/or weight based adjustment of the mA/kV was utilized to reduce the radiation dose to as low as reasonably achievable. COMPARISON: None HISTORY: ORDERING SYSTEM PROVIDED HISTORY: CONFUSION/DELIRIUM, ALTERED LOC, UNEXPLAINED TECHNOLOGIST PROVIDED HISTORY: Has a \"code stroke\" or \"stroke alert\" been called? ->No FINDINGS: BRAIN/VENTRICLES: Significant streak artifact from dental hardware severely limits evaluation of the posterior fossa. There is a moderate-sized area of low attenuation in the left occipital lobe involving both gray and white matter suggestive of an acute to subacute infarct. A punctate hyperdensity centrally (image 14) is favored to represent calcification versus a tiny petechial hemorrhage.   Mild regional mass effect and the origin of the right vertebral artery. There is moderate stenosis in the V4 segments bilaterally due to mixed plaque. SOFT TISSUES:  The lung apices are clear. No cervical or superior mediastinal lymphadenopathy. The visualized portion of the larynx and pharynx appear unremarkable. The parotid, submandibular and thyroid glands demonstrate no acute abnormality. BONES: There is moderate levoconvex scoliosis. There are laminectomies at C3 and C4. Moderate spondylosis and multilevel degenerative disc disease. Moderate to severe stenosis origin of the right vertebral artery and distal V4 segments bilaterally. RECOMMENDATIONS: Sensitivity is limited without curved multiplanar reformats. An addendum will be performed when these are available. Labs and imaging reviewed with Dr. Fidel Taylor. EXAM:  Gen: Alert and cooperative. NAD  CV: RRR  Resp: CTAB  Abd: soft, non-distended  Skin: Cap refill <2 seconds    NEURO EXAM:  Alert and Oriented to person, but not time or place. Follows all commands. Strength 5/5 in all extremities. No sensory deficits noted. Pupils 3mm and reactive bilaterally. EOMI. No tongue deviation. Face is symmetric. Right hemianopsia and partial right sided neglect noted. PLAN:  - MRI pending for today  -Plan for angio today with neuroendovascular. - will hold Eliquis as patient had recent procedure and CT head shows possible tiny petechial hemmorhage     CARDIOVASCULAR:  - home antihypertensives restarted  - Prn antihypertensives for SBP goal 110-180     PULMONARY:  - saturating well on 2L NC     RENAL/FLUID/ELECTROLYTE:  - 125 ml/hr NS  - replace lytes PRN  - Strict I/O's     GI/NUTRITION:  NUTRITION:  Diet NPO, After Midnight Exceptions are: Sips with Meds.  Will advance after angio     ENDOCRINE:  PROBLEMS:  - hyperglycemia  PLAN:   - monitor blood glucose  - ISS     OTHER:  - Ortho to follow for patients L hip replacement, POD#3     PROPHYLAXIS:              Stress ulcer: H2

## 2017-11-09 NOTE — PROGRESS NOTES
Vision: Impaired  Vision Exceptions: Wears glasses at all times  Hearing: Exceptions to [facility]  Hearing Exceptions: Hard of hearing/hearing concerns    Orientation  Overall Orientation Status: Impaired  Orientation Level: Oriented to person;Oriented to time;Oriented to situation;Disoriented to place (A&Ox4 upon arrival, later in session A&Ox3)  Observation/Palpation  Posture: Fair  Balance  Sitting Balance: Stand by assistance  Standing Balance: Contact guard assistance (R lean)  Standing Balance  Time: 6 min  Activity: Pt stood in sondra guevara x2  Sit to stand: Stand by assistance  Stand to sit: Contact guard assistance  Functional Mobility  Functional Mobility Comments: CINDY d/t low BP  ADL  Feeding: Setup;Supervision  Grooming: Setup;Stand by assistance  UE Bathing: Setup;Minimal assistance  LE Bathing: Setup; Moderate assistance  UE Dressing: Setup; Moderate assistance  LE Dressing: Setup;Maximum assistance  Toileting: Setup; Moderate assistance  Tone RUE  RUE Tone: Normotonic  Tone LUE  LUE Tone: Normotonic  Coordination  Movements Are Fluid And Coordinated: No  Coordination and Movement description: Decreased speed     Bed mobility  Rolling to Left: Minimal assistance  Rolling to Right: Minimal assistance  Supine to Sit: Moderate assistance (x2)  Sit to Supine: Maximum assistance (x2)  Scooting: Contact guard assistance  Comment: /70 while supine  Transfers  Sit to stand: Stand by assistance  Stand to sit: Contact guard assistance      Sensation  Overall Sensation Status: WFL      LUE AROM (degrees)  LUE AROM : WFL  RUE AROM (degrees)  RUE AROM : WFL  LUE Strength  Gross LUE Strength: WFL  L Hand Grasp: 5/5  L Hand Release: 5/5  RUE Strength  Gross RUE Strength: WFL  R Hand Grasp: 5/5  R Hand Release: 5/5      Assessment   Performance deficits / Impairments: Decreased functional mobility ; Decreased ADL status; Decreased strength;Decreased safe awareness;Decreased cognition;Decreased endurance;Decreased balance  Prognosis: Fair  Patient Education: Hip precautions, OTPOC, discharge rec, safety awareness, re-orientation  Discharge Recommendations: Subacute/Skilled Nursing Facility;Continue to assess pending progress  REQUIRES OT FOLLOW UP: Yes  Activity Tolerance  Activity Tolerance: Treatment limited secondary to decreased cognition;Patient limited by fatigue;Patient limited by pain  Safety Devices  Type of devices: Nurse notified; Patient at risk for falls;Gait belt;Call light within reach; All fall risk precautions in place; Left in bed  Restraints  Initially in place: Yes  Restraints: roll belt, telesitter        Discharge Recommendations:  [address], Continue to assess pending progress     Plan   Plan  Times per week: 5-6x   Current Treatment Recommendations: Balance Training, Functional Mobility Training, Endurance Training, Self-Care / ADL, Safety Education & Training, Patient/Caregiver Education & Training, Equipment Evaluation, Education, & procurement, Pain Management    G-Code  OT G-codes  Functional Assessment Tool Used: [facility]  Score: 17/24  Functional Limitation: Self care  Self Care Current Status (): At least 40 percent but less than 60 percent impaired, limited or restricted  Self Care Goal Status ():  At least 20 percent but less than 40 percent impaired, limited or restricted  Goals  Short term goals  Time Frame for Short term goals: by discharge, pt will  Short term goal 1: demo supine<>sit transfer to EOB with min Ax2  Short term goal 2: demo sit<>stand with suprvision and least restrictive AD  Short term goal 3: demo good safety awareness during func mob around room with mod A and least restrictive AE  Short term goal 4: identify 2 pain-relieving tech's with 1 vc  Short term goal 5: demo A&Ox4 for 3/4 tx sessions  Short term goal 6: demo ADL UB/LB dressing/bathing activity seated with mod A for LB, SBA for UB     Therapy Time   Individual Concurrent Group Co-treatment   Time In 0831         Time Out 0931         Minutes 60            Pt supine in bed with call light within reach upon arrival. Pt educated on hip precautions with good return. Pt transferred to EOB with mod A x2 and static sat for 2 min. Pt transferred to standing with CGA in sondra stedy and vc'ing. Pt shifted weight L/R with encouragement and marched in place. Pt sat back and demo'd R lean d/t hip pain. Pt A&Ox4 at start of session however A&Ox3 at end of session. Pt stood again and returned to EOB d/t BP at 80's/40's. Pt returned to supine and performed ADL UB/LB bathing task supine with assist. Pt retired supine with RN and MD's present and pillow placed between knees to prevent adduction. Discharge recommendations discussed with patient during initial evaluation.     Coral Ballesteros, OTR/L

## 2017-11-09 NOTE — PLAN OF CARE
Problem: Falls - Risk of:  Goal: Will remain free from falls  Will remain free from falls   Outcome: Ongoing  Free of falls this shift.

## 2017-11-10 ENCOUNTER — APPOINTMENT (OUTPATIENT)
Dept: CT IMAGING | Age: 72
DRG: 065 | End: 2017-11-10
Attending: INTERNAL MEDICINE
Payer: MEDICARE

## 2017-11-10 LAB
ABSOLUTE EOS #: <0.03 K/UL (ref 0–0.44)
ABSOLUTE IMMATURE GRANULOCYTE: 0.05 K/UL (ref 0–0.3)
ABSOLUTE LYMPH #: 0.97 K/UL (ref 1.1–3.7)
ABSOLUTE MONO #: 0.78 K/UL (ref 0.1–1.2)
ANION GAP SERPL CALCULATED.3IONS-SCNC: 10 MMOL/L (ref 9–17)
BASOPHILS # BLD: 0 % (ref 0–2)
BASOPHILS ABSOLUTE: <0.03 K/UL (ref 0–0.2)
BUN BLDV-MCNC: 8 MG/DL (ref 8–23)
BUN/CREAT BLD: ABNORMAL (ref 9–20)
CALCIUM SERPL-MCNC: 8.3 MG/DL (ref 8.6–10.4)
CHLORIDE BLD-SCNC: 103 MMOL/L (ref 98–107)
CO2: 25 MMOL/L (ref 20–31)
CREAT SERPL-MCNC: 0.59 MG/DL (ref 0.7–1.2)
DIFFERENTIAL TYPE: ABNORMAL
EKG ATRIAL RATE: 322 BPM
EKG P AXIS: -88 DEGREES
EKG Q-T INTERVAL: 340 MS
EKG QRS DURATION: 88 MS
EKG QTC CALCULATION (BAZETT): 434 MS
EKG R AXIS: 27 DEGREES
EKG T AXIS: 64 DEGREES
EKG VENTRICULAR RATE: 98 BPM
EOSINOPHILS RELATIVE PERCENT: 0 % (ref 1–4)
GFR AFRICAN AMERICAN: >60 ML/MIN
GFR NON-AFRICAN AMERICAN: >60 ML/MIN
GFR SERPL CREATININE-BSD FRML MDRD: ABNORMAL ML/MIN/{1.73_M2}
GFR SERPL CREATININE-BSD FRML MDRD: ABNORMAL ML/MIN/{1.73_M2}
GLUCOSE BLD-MCNC: 151 MG/DL (ref 75–110)
GLUCOSE BLD-MCNC: 152 MG/DL (ref 75–110)
GLUCOSE BLD-MCNC: 155 MG/DL (ref 70–99)
GLUCOSE BLD-MCNC: 158 MG/DL (ref 75–110)
GLUCOSE BLD-MCNC: 164 MG/DL (ref 75–110)
GLUCOSE BLD-MCNC: 171 MG/DL (ref 75–110)
HCT VFR BLD CALC: 30.3 % (ref 40.7–50.3)
HEMOGLOBIN: 9.9 G/DL (ref 13–17)
IMMATURE GRANULOCYTES: 1 %
LV EF: 60 %
LVEF MODALITY: NORMAL
LYMPHOCYTES # BLD: 11 % (ref 24–43)
MAGNESIUM: 1.9 MG/DL (ref 1.6–2.6)
MCH RBC QN AUTO: 29.9 PG (ref 25.2–33.5)
MCHC RBC AUTO-ENTMCNC: 32.7 G/DL (ref 28.4–34.8)
MCV RBC AUTO: 91.5 FL (ref 82.6–102.9)
MONOCYTES # BLD: 9 % (ref 3–12)
PDW BLD-RTO: 12 % (ref 11.8–14.4)
PLATELET # BLD: 182 K/UL (ref 138–453)
PLATELET ESTIMATE: ABNORMAL
PMV BLD AUTO: 10.1 FL (ref 8.1–13.5)
POTASSIUM SERPL-SCNC: 3.5 MMOL/L (ref 3.7–5.3)
RBC # BLD: 3.31 M/UL (ref 4.21–5.77)
RBC # BLD: ABNORMAL 10*6/UL
SEG NEUTROPHILS: 80 % (ref 36–65)
SEGMENTED NEUTROPHILS ABSOLUTE COUNT: 7.29 K/UL (ref 1.5–8.1)
SODIUM BLD-SCNC: 138 MMOL/L (ref 135–144)
TSH SERPL DL<=0.05 MIU/L-ACNC: 0.95 MIU/L (ref 0.3–5)
WBC # BLD: 9.1 K/UL (ref 3.5–11.3)
WBC # BLD: ABNORMAL 10*3/UL

## 2017-11-10 PROCEDURE — 6370000000 HC RX 637 (ALT 250 FOR IP): Performed by: INTERNAL MEDICINE

## 2017-11-10 PROCEDURE — S0028 INJECTION, FAMOTIDINE, 20 MG: HCPCS | Performed by: EMERGENCY MEDICINE

## 2017-11-10 PROCEDURE — 6360000004 HC RX CONTRAST MEDICATION: Performed by: NEUROLOGICAL SURGERY

## 2017-11-10 PROCEDURE — 92523 SPEECH SOUND LANG COMPREHEN: CPT

## 2017-11-10 PROCEDURE — 6370000000 HC RX 637 (ALT 250 FOR IP): Performed by: STUDENT IN AN ORGANIZED HEALTH CARE EDUCATION/TRAINING PROGRAM

## 2017-11-10 PROCEDURE — 97110 THERAPEUTIC EXERCISES: CPT

## 2017-11-10 PROCEDURE — 85025 COMPLETE CBC W/AUTO DIFF WBC: CPT

## 2017-11-10 PROCEDURE — 70496 CT ANGIOGRAPHY HEAD: CPT

## 2017-11-10 PROCEDURE — 2500000003 HC RX 250 WO HCPCS: Performed by: EMERGENCY MEDICINE

## 2017-11-10 PROCEDURE — 36415 COLL VENOUS BLD VENIPUNCTURE: CPT

## 2017-11-10 PROCEDURE — 70498 CT ANGIOGRAPHY NECK: CPT

## 2017-11-10 PROCEDURE — 97530 THERAPEUTIC ACTIVITIES: CPT

## 2017-11-10 PROCEDURE — 2060000000 HC ICU INTERMEDIATE R&B

## 2017-11-10 PROCEDURE — 82947 ASSAY GLUCOSE BLOOD QUANT: CPT

## 2017-11-10 PROCEDURE — 93306 TTE W/DOPPLER COMPLETE: CPT

## 2017-11-10 PROCEDURE — 80048 BASIC METABOLIC PNL TOTAL CA: CPT

## 2017-11-10 PROCEDURE — G9168 MEMORY CURRENT STATUS: HCPCS

## 2017-11-10 PROCEDURE — 6370000000 HC RX 637 (ALT 250 FOR IP): Performed by: PSYCHIATRY & NEUROLOGY

## 2017-11-10 PROCEDURE — G9169 MEMORY GOAL STATUS: HCPCS

## 2017-11-10 PROCEDURE — 99233 SBSQ HOSP IP/OBS HIGH 50: CPT | Performed by: PSYCHIATRY & NEUROLOGY

## 2017-11-10 PROCEDURE — 99233 SBSQ HOSP IP/OBS HIGH 50: CPT | Performed by: INTERNAL MEDICINE

## 2017-11-10 PROCEDURE — 83735 ASSAY OF MAGNESIUM: CPT

## 2017-11-10 PROCEDURE — 84443 ASSAY THYROID STIM HORMONE: CPT

## 2017-11-10 PROCEDURE — 2580000003 HC RX 258: Performed by: INTERNAL MEDICINE

## 2017-11-10 PROCEDURE — 6360000002 HC RX W HCPCS: Performed by: INTERNAL MEDICINE

## 2017-11-10 PROCEDURE — 99291 CRITICAL CARE FIRST HOUR: CPT | Performed by: PSYCHIATRY & NEUROLOGY

## 2017-11-10 PROCEDURE — 97535 SELF CARE MNGMENT TRAINING: CPT

## 2017-11-10 RX ORDER — POTASSIUM CHLORIDE 20MEQ/15ML
40 LIQUID (ML) ORAL PRN
Status: DISCONTINUED | OUTPATIENT
Start: 2017-11-10 | End: 2017-11-13 | Stop reason: HOSPADM

## 2017-11-10 RX ORDER — POTASSIUM CHLORIDE 20 MEQ/1
40 TABLET, EXTENDED RELEASE ORAL PRN
Status: DISCONTINUED | OUTPATIENT
Start: 2017-11-10 | End: 2017-11-13 | Stop reason: HOSPADM

## 2017-11-10 RX ORDER — DIGOXIN 250 MCG
250 TABLET ORAL DAILY
Status: DISCONTINUED | OUTPATIENT
Start: 2017-11-11 | End: 2017-11-13 | Stop reason: HOSPADM

## 2017-11-10 RX ORDER — ASPIRIN 81 MG/1
324 TABLET, CHEWABLE ORAL DAILY
Status: DISCONTINUED | OUTPATIENT
Start: 2017-11-10 | End: 2017-11-13 | Stop reason: HOSPADM

## 2017-11-10 RX ORDER — POTASSIUM CHLORIDE 7.45 MG/ML
10 INJECTION INTRAVENOUS PRN
Status: DISCONTINUED | OUTPATIENT
Start: 2017-11-10 | End: 2017-11-13 | Stop reason: HOSPADM

## 2017-11-10 RX ORDER — DIGOXIN 0.25 MG/ML
250 INJECTION INTRAMUSCULAR; INTRAVENOUS EVERY 6 HOURS
Status: COMPLETED | OUTPATIENT
Start: 2017-11-10 | End: 2017-11-11

## 2017-11-10 RX ORDER — METOPROLOL TARTRATE 50 MG/1
50 TABLET, FILM COATED ORAL 2 TIMES DAILY
Status: DISCONTINUED | OUTPATIENT
Start: 2017-11-10 | End: 2017-11-13 | Stop reason: HOSPADM

## 2017-11-10 RX ADMIN — INSULIN LISPRO 2 UNITS: 100 INJECTION, SOLUTION INTRAVENOUS; SUBCUTANEOUS at 09:05

## 2017-11-10 RX ADMIN — INSULIN LISPRO 1 UNITS: 100 INJECTION, SOLUTION INTRAVENOUS; SUBCUTANEOUS at 21:19

## 2017-11-10 RX ADMIN — Medication 10 ML: at 21:00

## 2017-11-10 RX ADMIN — FAMOTIDINE 20 MG: 10 INJECTION, SOLUTION INTRAVENOUS at 20:59

## 2017-11-10 RX ADMIN — INSULIN LISPRO 2 UNITS: 100 INJECTION, SOLUTION INTRAVENOUS; SUBCUTANEOUS at 12:36

## 2017-11-10 RX ADMIN — INSULIN LISPRO 2 UNITS: 100 INJECTION, SOLUTION INTRAVENOUS; SUBCUTANEOUS at 16:33

## 2017-11-10 RX ADMIN — FAMOTIDINE 20 MG: 10 INJECTION, SOLUTION INTRAVENOUS at 09:02

## 2017-11-10 RX ADMIN — METOPROLOL TARTRATE 50 MG: 50 TABLET, FILM COATED ORAL at 12:24

## 2017-11-10 RX ADMIN — ASPIRIN 324 MG: 81 TABLET, CHEWABLE ORAL at 09:04

## 2017-11-10 RX ADMIN — AMLODIPINE BESYLATE 5 MG: 5 TABLET ORAL at 09:02

## 2017-11-10 RX ADMIN — DIGOXIN 250 MCG: 0.25 INJECTION INTRAMUSCULAR; INTRAVENOUS at 12:22

## 2017-11-10 RX ADMIN — ATORVASTATIN CALCIUM 40 MG: 40 TABLET, FILM COATED ORAL at 20:59

## 2017-11-10 RX ADMIN — POTASSIUM CHLORIDE 40 MEQ: 40 SOLUTION ORAL at 14:50

## 2017-11-10 RX ADMIN — DIGOXIN 250 MCG: 0.25 INJECTION INTRAMUSCULAR; INTRAVENOUS at 18:34

## 2017-11-10 RX ADMIN — DOCUSATE SODIUM 100 MG: 100 CAPSULE ORAL at 09:02

## 2017-11-10 RX ADMIN — DOCUSATE SODIUM 100 MG: 100 CAPSULE ORAL at 20:59

## 2017-11-10 RX ADMIN — TAMSULOSIN HYDROCHLORIDE 0.4 MG: 0.4 CAPSULE ORAL at 20:59

## 2017-11-10 RX ADMIN — IOPAMIDOL 90 ML: 755 INJECTION, SOLUTION INTRAVENOUS at 11:54

## 2017-11-10 RX ADMIN — METOPROLOL TARTRATE 50 MG: 50 TABLET, FILM COATED ORAL at 20:59

## 2017-11-10 RX ADMIN — METOPROLOL SUCCINATE 50 MG: 50 TABLET, FILM COATED, EXTENDED RELEASE ORAL at 09:02

## 2017-11-10 ASSESSMENT — PAIN DESCRIPTION - LOCATION: LOCATION: HIP

## 2017-11-10 ASSESSMENT — ENCOUNTER SYMPTOMS
BLURRED VISION: 1
SPUTUM PRODUCTION: 0
CONSTIPATION: 0
SHORTNESS OF BREATH: 0
NAUSEA: 0
BACK PAIN: 0
COUGH: 0
VOMITING: 0
BLOOD IN STOOL: 0
WHEEZING: 0
DOUBLE VISION: 0
PHOTOPHOBIA: 0

## 2017-11-10 ASSESSMENT — PAIN DESCRIPTION - FREQUENCY: FREQUENCY: CONTINUOUS

## 2017-11-10 ASSESSMENT — PAIN DESCRIPTION - PAIN TYPE: TYPE: SURGICAL PAIN

## 2017-11-10 ASSESSMENT — PAIN DESCRIPTION - PROGRESSION: CLINICAL_PROGRESSION: GRADUALLY WORSENING

## 2017-11-10 ASSESSMENT — PAIN DESCRIPTION - DESCRIPTORS: DESCRIPTORS: ACHING

## 2017-11-10 ASSESSMENT — PAIN SCALES - GENERAL: PAINLEVEL_OUTOF10: 5

## 2017-11-10 ASSESSMENT — PAIN DESCRIPTION - ORIENTATION: ORIENTATION: LEFT

## 2017-11-10 ASSESSMENT — PAIN DESCRIPTION - ONSET: ONSET: ON-GOING

## 2017-11-10 NOTE — PROGRESS NOTES
Occupational Therapy  Facility/Department: [address]  Daily Treatment Note  NAME: Guevara Mar  : 1945  MRN: [id number]    Date of Service: 11/10/2017    Patient Diagnosis(es): There were no encounter diagnoses. has a past medical history of Arthritis; Diabetes ([facility] [address] Hearing aid worn; [place] (hard of hearing); Hyperlipidemia; Hypertension; and Occipital infarction ([facility] [address] has a past surgical history that includes Neck surgery; back surgery; Cholecystectomy; Cataract removal (Right, 2016); Total hip arthroplasty (Left, 2017); and Total hip arthroplasty (Left, 2017). Restrictions  Restrictions/Precautions  Restrictions/Precautions: Weight Bearing, General Precautions  Required Braces or Orthoses?: No  Lower Extremity Weight Bearing Restrictions  Left Lower Extremity Weight Bearing: Weight Bearing As Tolerated  Position Activity Restriction  Other position/activity restrictions: Anterior approach CHRISTIANO - no straight leg raises  Subjective   General  Patient assessed for rehabilitation services?: Yes  Family / Caregiver Present: Yes (wife)  Diagnosis: L occipital lobe infarct, L CHRISTIANO   Pain Assessment  Patient Currently in Pain: No  Vital Signs  Patient Currently in Pain: No   Orientation  Orientation  Overall Orientation Status: Impaired  Orientation Level: Oriented to person;Disoriented to place; Disoriented to time;Disoriented to situation  Objective    Pt in bed upon arrival awake and alert. Pt demo bed mob, used sondra steady to complete safe transfer from bed<recliner. Pt sat in recliner to complete ADLs on table top. Pt used HIBICLENS for bathing soap on this date. ADL  Grooming: Setup;Minimal assistance; Increased time to complete;Verbal cueing  UE Bathing: Setup; Increased time to complete;Minimal assistance  LE Bathing: Setup; Increased time to complete; Moderate assistance;Minimal assistance  UE Dressing: Setup; Increased time to complete;Minimal assistance  LE Dressing: Setup;Maximum assistance   Balance  Sitting Balance: Stand by assistance (seated on EOB)  Standing Balance: Minimal assistance (w/sondra steady)  Standing Balance  Sit to stand: Minimal assistance  Stand to sit: Minimal assistance  Comment: bed raised- sondra steady used  Bed mobility  Supine to Sit: Moderate assistance  Scooting: Minimal assistance  Transfers  Sit to stand: Minimal assistance  Stand to sit: Minimal assistance  Attendance  Participation: Active participation    Assessment   Performance deficits / Impairments: Decreased functional mobility ; Decreased ADL status; Decreased strength;Decreased safe awareness;Decreased cognition;Decreased endurance;Decreased balance  Prognosis: Good  Patient Education: Ed on OT services/POC, bed mob, sondra steady transfer and safety, HIBICLENS prec, hip prec, orientation review- good return  Discharge Recommendations: IP Rehab (pt could tolerate 3 + hours of therapy)  REQUIRES OT FOLLOW UP: Yes  Activity Tolerance  Activity Tolerance: Patient Tolerated treatment well       Discharge Recommendations:  IP Rehab (pt could tolerate 3 + hours of therapy)     Plan   Plan  Times per week: 5-6x   Current Treatment Recommendations: Balance Training, Functional Mobility Training, Endurance Training, Self-Care / ADL, Safety Education & Training, Patient/Caregiver Education & Training, Equipment Evaluation, Education, & procurement, Pain Management    Goals  Short term goals  Time Frame for Short term goals: by discharge, pt will  Short term goal 1: demo supine<>sit transfer to EOB with min Ax2  Short term goal 2: demo sit<>stand with suprvision and least restrictive AD  Short term goal 3: demo good safety awareness during func mob around room with mod A and least restrictive AE  Short term goal 4: identify 2 pain-relieving tech's with 1 vc  Short term goal 5: demo A&Ox4 for 3/4 tx sessions  Short term goal 6: demo ADL UB/LB dressing/bathing activity seated with mod A for LB, SBA for UB       Therapy Time   Individual Concurrent Group Co-treatment   Time In  1400         Time Out  SHOLA Barboza/L

## 2017-11-10 NOTE — CONSULTS
NEUROLOGY INPATIENT CONSULT NOTE    11/10/2017         Yamilka Boyle is a  67 y.o. male admitted on 11/8/2017 with  Stroke determined by clinical assessment [facility]) [I63.9]      History is obtained mostly from the patient and the medical record and from the caregivers. Chart is reviewed and patient is examined. Briefly, this is a  67 y.o. male with history of HTN, DM, HLD admitted on 11/8/2017 with L occipital infarct. Patient had total L hip replacement at [address] on 11/7. Per charting, the nurse reported that he had some episodes of confusion the morning of 11/8, but was still oriented x3. Due to persistent confusion throughout the day, Internal Medicine was consulted. Due to the fact that he had worsening confusion, as well as hallucinations, a CT head was performed which showed moderate size  acute/subacute L occipital lobe infarct, possible tiny petechial hemorrhage vs. Calcification, otherwise unremarkable study. Dr. Gilson Womack of neuroendovascular was consulted and ordered CTA head and neck. CTA's show severe stenosis distal V4 segments in the vertebral arteries bilaterally due to mixed plaque. Dr. Gilson Womack informed of results and patient was transferred to [facility] with plan for diagnostic angio and MRI Brain; these studies have not yet been done. There is no apparent history for a cardioembolic source. He came to this hospital as a transfer Eliquis 2.5 mg twice a day; an office H and P from October 26, 2017 makes no mention of anticoagulation medication nor any reason to be on that medication. It appears that this has been started by the orthopedic service at [address] [place] [address] as DVT prophylaxis on account of the THR. Meanwhile, the patient's wife states that has been getting much more clearheaded. Internal medicine (Dr. Anushka Kimball) on a follow-up note at 21/11/11/9 noted that telemetry looked like atrial flutter.   However due to anticipated cerebral angiography 11/10, anticoagulation was not started; patient is on Lovenox 40 mg subcu daily for DVT prophylaxis, he is not on the [facility] request that he came from [facility]. Angelica's taking. Brain 11/9 films reviewed by present physician shows a large left PCA distribution acute infarction and also a small right cerebellar hemisphere acute infarction which is somewhat patchy.      LDL 48  A1c 6.0  Echo still pending      No current facility-administered medications on file prior to encounter. Current Outpatient Prescriptions on File Prior to Encounter   Medication Sig Dispense Refill    docusate sodium (COLACE) 100 MG capsule Take 1 capsule by mouth 2 times daily 30 capsule 0    oxyCODONE-acetaminophen (PERCOCET) 5-325 MG per tablet Take 1-2 tablets by mouth every 4 hours as needed for Pain . 80 tablet 0    ondansetron (ZOFRAN) 4 MG tablet Take 1 tablet by mouth every 6 hours as needed for Nausea 30 tablet 1    apixaban (ELIQUIS) 2.5 MG TABS tablet Take 1 tablet by mouth 2 times daily 60 tablet 0    metFORMIN (GLUCOPHAGE) 500 MG tablet Take 500 mg by mouth 2 times daily (with meals)      atorvastatin (LIPITOR) 40 MG tablet Take 40 mg by mouth nightly      gabapentin (NEURONTIN) 300 MG capsule Take 300 mg by mouth 2 times daily      amLODIPine (NORVASC) 5 MG tablet Take 5 mg by mouth daily      lisinopril (PRINIVIL;ZESTRIL) 20 MG tablet Take 20 mg by mouth 2 times daily      Cholecalciferol (VITAMIN D3 PO) Take 1 tablet by mouth 2 times daily      Insulin NPH Isophane & Regular (HUMULIN 70/30 PEN) (70-30) 100 UNIT per ML injection pen Inject 30 Units into the skin every morning      Insulin NPH Isophane & Regular (HUMULIN 70/30 PEN) (70-30) 100 UNIT per ML injection pen Inject 22 Units into the skin nightly      tamsulosin (FLOMAX) 0.4 MG capsule Take 0.4 mg by mouth nightly      metoprolol succinate (TOPROL XL) 50 MG extended release tablet Take 50 mg by mouth daily       Allergies: Kae Salazar has No Known Allergies.     Past Medical History: PND   Gastrointestinal Negative for abdominal pain, diarrhea, blood in stool   Musculoskeletal Negative for joint pain, negative for myalgia   Skin Negative for rash or itching   Endo/heme/allergies Negative for polydipsia, environmental allergy   Psychiatric Negative for suicidal ideation. Patient is not anxious           Objective:   /69   Pulse 109   Temp 98.3 °F (36.8 °C) (Oral)   Resp 24   Ht 6' 1\" (1.854 m)   Wt 204 lb 2.3 oz (92.6 kg)   SpO2 97%   BMI 26.93 kg/m²     Blood pressure range: Systolic (14RUD), NTJ:848 , Min:105 , ANY:108   ; Diastolic (90FDP), BDH:17, Min:36, Max:101      Continuous infusions:    sodium chloride 75 mL/hr at 11/09/17 1620    dextrose         ON EXAMINATION:  GENERAL  Appears comfortable and in no distress   HEENT  NC/ AT   NECK  Supple and no bruits heard   MENTAL STATUS:  Alert, partially oriented to place and time. No language parameter disturbance verbally. Some inattentiveness, some difficulty following complex commands, follows simple commands well.      CRANIAL NERVES: II     -       dense RHH  III,IV,VI - PRRRE without nystagmus; EOMs full, no afferent defect, no                      RAYNA, no ptosis  V     -     Normal facial sensation  VII    -     Normal facial symmetry  VIII   -     Intact hearing  IX,X -     Symmetrical palate, could swallow  XI    -     Symmetrical shoulder shrug  XII   -     Midline tongue, no atrophy    MOTOR FUNCTION:  Normal bulk, normal tone, except that strength testing in proximal lower extremities is limited due to left hip pain, otherwise normal power;  no involuntary movements, no tremor   SENSORY FUNCTION:  Normal touch, normal pain grossly    CEREBELLAR FUNCTION:  no upper extremity limb cerebellar signs    REFLEX FUNCTION:  Symmetric, 1/4, plantars flexor    STATION and GAIT  Not tested         Data:    Lab Results:     Lab Results   Component Value Date    CHOL 98 11/09/2017    LDLCHOLESTEROL 48 11/09/2017    HDL 36 (L) 11/09/2017    TRIG 71 11/09/2017    ALT 20 03/07/2016    AST 23 03/07/2016    TSH 0.95 11/10/2017    INR 1.1 11/09/2017    LABA1C 6.0 11/09/2017    LABMICR 6 08/27/2012     Hematology:  Recent Labs      11/08/17   0756  11/09/17   0602  11/10/17   0627   WBC  11.9*  10.7  9.1   HGB  12.0*  10.3*  9.9*   HCT  35.8*  31.9*  30.3*   PLT  256  191  182   INR   --   1.1   --      Chemistry:  Recent Labs      11/08/17   0756  11/09/17   0602  11/10/17   0627   NA  136  133*  138   K  4.0  3.7  3.5*   CL  99  100  103   CO2  28  24  25   GLUCOSE  179*  149*  155*   BUN  13  9  8   CREATININE  0.66*  0.60*  0.59*   MG   --    --   1.9   CALCIUM  8.4*  8.3*  8.3*     Recent Labs      11/09/17   0602  11/10/17   0627   LABA1C  6.0   --    TSH   --   0.95   CHOL  98   --    HDL  36*   --    LDLCHOLESTEROL  48   --    CHOLHDLRATIO  2.7   --    TRIG  71   --    VLDL  NOT REPORTED   --              Diagnostic data reviewed:       EXAMINATION:   MRI OF THE BRAIN WITHOUT CONTRAST  11/9/2017 12:25 pm       TECHNIQUE:   Multiplanar multisequence MRI of the brain was performed without the   administration of intravenous contrast.       COMPARISON:   None.       HISTORY:   ORDERING SYSTEM PROVIDED HISTORY: eval stroke       FINDINGS:   INTRACRANIAL STRUCTURES/VENTRICLES: Ventricles and sulci are normal in   caliber.  A few small foci of increased T2 and FLAIR signal are noted in the   periventricular and subcortical white matter bilaterally.  More focal   increased T2 and FLAIR signal is noted in the left occipital parasagittal   region with extension into the posterior medial left temporal lobe. Corresponding hyperintense diffusion signal is noted in the left occipital   pole wi th extension into the left posterior medial temporal region as well.    Small foci of increased diffusion signal are also noted in the right   cerebellar hemisphere. Small foci of correlating increased T2 and FLAIR   signal are also noted.  Flow voids are The anterior and middle cerebral arteries appear normal.  The anterior communicating artery is not well visualized. POSTERIOR CIRCULATION: There appears to be severe stenosis in the distal V4 segments of the vertebral arteries bilaterally due to mixed plaque. There is a posterior communicating artery present on the right but not the left. ANEURYSM: No intracranial aneurysm is seen. 1. No acute intracranial abnormality. 2. Severe stenosis distal V4 segments in the vertebral arteries bilaterally due to mixed plaque. RECOMMENDATIONS: Sensitivity is limited without 3D reformats. An addendum will be performed when these are available. Xr Chest Standard (2 Vw)    Result Date: 10/25/2017  EXAMINATION: TWO VIEWS OF THE CHEST 10/25/2017 12:37 pm COMPARISON: August 28, 2013 HISTORY: ORDERING SYSTEM PROVIDED HISTORY: PAT TECHNOLOGIST PROVIDED HISTORY: Patient is in pre-admission testing. Reason for exam:->PAT Ordering Physician Provided Reason for Exam: pre op Acuity: Unknown Type of Exam: Unknown Preoperative clearance for hip surgery in a patient with multiple comorbidities to include diabetes, hypertension and hyperlipidemia. FINDINGS: The heart is not enlarged. No significant pulmonary edema. Mild elevation of the right hemidiaphragm is unchanged. No focal lung consolidation or infiltrate. No pleural effusion or pneumothorax. Right upper quadrant surgical clips indicate prior cholecystectomy. Stable exam without acute cardiopulmonary process. Xr Pelvis (1-2 Views)    Result Date: 11/7/2017  EXAMINATION: 2 VIEWS OF THE LEFT HIP; SINGLE VIEW OF THE PELVIS 11/7/2017 11:46 am COMPARISON: Intraoperative images from earlier today HISTORY: ORDERING SYSTEM PROVIDED HISTORY: left CHRISTIANO TECHNOLOGIST PROVIDED HISTORY: Of operative side while in recovery room.  Reason for exam:->left CHRISTIANO Ordering Physician Provided Reason for Exam: post op total Acuity: Unknown Type of Exam: Unknown FINDINGS: Postop changes from left total hip arthroplasty are present. Prosthetic components are aligned. There is no evidence of periprosthetic fracture. Remainder of the bony pelvis appears intact. Mild degenerative changes are noted in the right hip, partially imaged. There is degenerative change in the lower lumbar spine, partially imaged. Small phleboliths are noted. Vascular calcifications are seen. A Bowers catheter is visualized. Soft tissue gas is seen related to recent surgery. Status post left hip arthroplasty without evidence of acute osseous or hardware complication. Mild right hip osteoarthrosis. Xr Hip Left (2-3 Views)    Result Date: 11/7/2017  EXAMINATION: SPOT FLUOROSCOPIC IMAGES 11/7/2017 10:36 am TECHNIQUE: Fluoroscopy was provided by the radiology department for procedure. Radiologist was not present during examination. FLUOROSCOPY DOSE AND TYPE OR TIME AND EXPOSURES: 53.8 seconds, 12.63 mGy, 8 fluoroscopic images COMPARISON: Left hip plain radiographs from 08/04/2010 HISTORY: Intraprocedural imaging. 65-year-old male receiving total left hip arthroplasty FINDINGS: Intraoperative fluoroscopy of the left hipwas performed. 8 fluoroscopic spot images were obtained during the procedure. A radiologist was not present at the time of image acquisition. The exam is therefore limited and was strictly for intraoperative guidance. Fluoroscopic time utilized was 53.8 seconds. Surgical instrumentation and retraction devices project over the native left hip on the initial image. There is severe left hip osteoarthrosis. On the following images, there is subsequent placement of a total left hip arthroplasty device and associated surgical instrumentation. The left femoral head projects centrally over the left acetabular component. Multifocal soft tissue gas is seen surrounding the left hip joint space and left thigh. Intraprocedural fluoroscopic spot images as above during placement of total left hip arthroplasty.   See suggestive of an acute to subacute infarct. A punctate hyperdensity centrally (image 14) is favored to represent calcification versus a tiny petechial hemorrhage. Mild regional mass effect and sulcal effacement without midline shift. Subtle less prominent low-attenuation on the right could be artifactual.  Ventricles are not dilated. Calcific plaque of intracranial vessels. Mild subtle low attenuation in the periventricular white matter is nonspecific likely due to chronic small vessel ischemia. ORBITS: The visualized portion of the orbits demonstrate no acute abnormality. Previous left lens replacement. SINUSES: The visualized paranasal sinuses and mastoid air cells demonstrate no acute abnormality. SOFT TISSUES/SKULL:  No acute abnormality of the visualized skull or soft tissues. Acute/subacute left occipital lobe infarct. Possible tiny petechial hemorrhage versus calcification. Findings were discussed with Dr. Christine At 4:01 pm on 11/8/2017. Cta Neck W Wo Contrast    Result Date: 11/8/2017  EXAMINATION: CTA OF THE NECK 11/8/2017 6:05 pm TECHNIQUE: CTA of the neck was performed with the administration of intravenous contrast. Multiplanar reformatted images are provided for review. MIP images are provided for review. Stenosis of the internal carotid arteries measured using NASCET criteria. Dose modulation, iterative reconstruction, and/or weight based adjustment of the mA/kV was utilized to reduce the radiation dose to as low as reasonably achievable. COMPARISON: None. HISTORY: ORDERING SYSTEM PROVIDED HISTORY: acute cva FINDINGS: AORTIC ARCH/GREAT VESSELS: There is a normal branch pattern of the aortic arch. No significant stenosis is seen of the innominate artery or subclavian arteries. CAROTID ARTERIES: The common carotid arteries are normal in appearance without evidence of a flow limiting stenosis.  The internal carotid arteries are normal in appearance without evidence of a flow limiting

## 2017-11-10 NOTE — PROGRESS NOTES
Neuro critical care:     Patient continues to be in atrial fibrillation with intermittent RVR. He has been started on digoxin and beta blocker. Cardiology recommends AMY and possible cardioversion as outpatient. He was on Eliquis prior to his procedure. He is Day 03 since his cerebral infarction in left occipital area. Recommend restarting Eliquis in 5 days from his stroke ( 11/13) , given no interval neuro worsening or concerns of hemorrhage. Continue statins. Therapy evaluations including OT for vision deficits.      Grady Hills MD

## 2017-11-10 NOTE — PLAN OF CARE
Problem: Falls - Risk of:  Goal: Will remain free from falls  Will remain free from falls   Outcome: Ongoing  Pt remains free of falls at this time. Bed locked in lowest position, siderails x2, call light in reach. Non-skid footwear applied. Encouraged pt to call for assistance as needed for safety. Falling star posted outside of room. Will continue to monitor.

## 2017-11-10 NOTE — CARE COORDINATION
Joint Replacement Navigator Daily Rounding Note    Admission Date:   11/8/2017 Olinda Galarza is a 67 y.o.  male    Post Op Day # 3    Labs:         Recent Labs      11/08/17   0756  11/09/17   0602  11/10/17   0627   WBC  11.9*  10.7  9.1   HGB  12.0*  10.3*  9.9*   PLT  256  191  182       Recent Labs      11/08/17   0756  11/09/17   0602  11/10/17   0627   NA  136  133*  138   K  4.0  3.7  3.5*   CL  99  100  103   CO2  28  24  25   BUN  13  9  8   CREATININE  0.66*  0.60*  0.59*   GLUCOSE  179*  149*  155*         Met with:     Patient and Family  Patient's LOC:   Alert, oriented to self, person  Patient progress   Delayed tx d/t neuro diagnostics. Received PT earlier and OT now. Reviewed activity level w/ RN  Patient's Pain:   Patient rates pain 0  Oral Intake: May start to eat per md  Output:          ON-Q:    no    Walker/DME:  Walker/DME needed:  No  DME needed:    Will not order equipment d/t going to facility   DME Agency:         Anticoagulation:  Anticoagulation:  lovenox now, was held  Prescription in chart:  no     Continuity of Care: The [address] form is on the chart. The [address] form is not signed by the physician. Discharge Planning:  Confirmed with patient that discharge plan is Skilled Facility. Agency/Facility chosen: [address] pre-certification no  Anticipated discharge date: TBD    Dressing dry, intact Aquacel. Will remain until post op follow up  Card given to family. Discussed follow up process and post dc calls    Patient's family questions and concerns were answered. Actively listened and provided reassurance.      Electronically signed by: Rashid Albarran RN on 11/10/2017 at 2:25 PM

## 2017-11-10 NOTE — PROGRESS NOTES
Speech Language Pathology  [address]  Initial Speech/Language/Cognitive Assessment    NAME: Lizeth Guerrero  : 1945   MRN: [id number]  ADMISSION DATE: 2017  ADMITTING DIAGNOSIS: has Acute metabolic encephalopathy; Arthralgia of left hip; Controlled type 2 diabetes mellitus without complication, with long-term current use of insulin ([address] Essential hypertension; Dyslipidemia; Occipital infarction ([address] Stenosis of right vertebral artery; and Hypertension on his problem list.      Date of Eval: 11/10/2017   Evaluating Therapist: MIRTHA Yepez  Primary Complaint: The patient is a 67 y.o. male who presents with an occipital stroke  The patient underwent total hip arthroplasty at [address] [place] [address] on   The family reports that on postop day 1 and became confused  This progressed into grabbing and reaching at things that were not there  He was having difficulty speaking  Workup revealed and occipital stroke  He was transferred to [facility] for neurology/endovascular evaluation  Imaging demonstrates vertebral stenosis  Through the night the patient has improved  The family reports that he is essentially nearing his baseline    Pain:  Pain Assessment  Patient Currently in Pain: No  Pain Assessment: 0-10  Pain Level: 5  Pain Type: Surgical pain  Pain Location: Hip  Pain Orientation: Left  Pain Descriptors: Aching  Pain Frequency: Continuous  Clinical Progression: Gradually worsening  Response to Pain Intervention: Patient Satisfied    Assessment:   Pt presents with Mild to moderate cognitive deficits characterized by impaired recall, divergent thinking and thought flexibility/Verbal organization. ST to follow up and provide treatment to address noted deficits. Education provided. ST recommends OP ST as needed at this time.     Recommendations:  Requires SLP Intervention: Yes  Duration/Frequency of Treatment: 3-5x/WEEK  D/C Recommendations: Home with intermittent

## 2017-11-10 NOTE — PLAN OF CARE
Labs      11/07/17   1533  11/08/17   0756  11/09/17   0602   NA   --   136  133*   K   --   4.0  3.7   CL   --   99  100   CO2   --   28  24   GLUCOSE   --   179*  149*   BUN  12  13  9   CREATININE  0.77  0.66*  0.60*   CALCIUM   --   8.4*  8.3*     Recent Labs      11/09/17   0602   LABA1C  6.0   CHOL  98   TRIG  71   HDL  36*       PROBLEMS:  Principal Problem:    Occipital infarction (HCC)  Active Problems:    Controlled type 2 diabetes mellitus without complication, with long-term current use of insulin (HCC)    Essential hypertension    Dyslipidemia    Stenosis of right vertebral artery    Hypertension      UPDATED PLAN:  See current orders  Check EKG  Neurology evaluation  ASA  EPCs  Will monitor and control Blood Pressure  Will monitor and control Diabetes  Physical therapy and rehabilitation   Social Service consult for discharge planning  The patient's status and plan have been discussed with the patient and family at the bedside    IP CONSULT TO NEUROLOGY  IP CONSULT TO [address] [place] [address]  11/9/2017  9:11 PM

## 2017-11-10 NOTE — PROGRESS NOTES
fluent  Moving extremities x four  Still has visual field deficits to his right   Skin: Skin is warm and dry. He is not diaphoretic. Meds:  Allergies: No Known Allergies    Current Meds:       aspirin chewable tablet 324 mg Daily   potassium chloride (KLOR-CON M) extended release tablet 40 mEq PRN   Or    potassium chloride 20 MEQ/15ML (10%) oral solution 40 mEq PRN   Or    potassium chloride 10 mEq/100 mL IVPB (Peripheral Line) PRN   metoprolol tartrate (LOPRESSOR) tablet 50 mg BID   digoxin (LANOXIN) injection 250 mcg Q6H   [START ON 11/11/2017] digoxin (LANOXIN) tablet 250 mcg Daily   famotidine (PEPCID) injection 20 mg BID   enoxaparin (LOVENOX) injection 40 mg Daily   0.9 % sodium chloride infusion Continuous   acetaminophen (TYLENOL) tablet 650 mg Q4H PRN   docusate sodium (COLACE) capsule 100 mg BID   ondansetron (ZOFRAN) injection 4 mg Q6H PRN   sodium chloride flush 0.9 % injection 10 mL 2 times per day   sodium chloride flush 0.9 % injection 10 mL PRN   dextrose 5 % solution PRN   dextrose 50 % solution 12.5 g PRN   glucagon (rDNA) injection 1 mg PRN   glucose (GLUTOSE) 40 % oral gel 15 g PRN   atorvastatin (LIPITOR) tablet 40 mg Nightly   HYDROcodone-acetaminophen (NORCO) 5-325 MG per tablet 1 tablet Q4H PRN   hydrOXYzine (ATARAX) tablet 25 mg TID PRN   insulin lispro (HUMALOG) injection vial 0-12 Units TID WC   insulin lispro (HUMALOG) injection vial 0-6 Units Nightly   LORazepam (ATIVAN) tablet 0.5 mg Q4H PRN   sodium chloride flush 0.9 % injection 10 mL PRN   tamsulosin (FLOMAX) capsule 0.4 mg Nightly   labetalol (NORMODYNE;TRANDATE) injection 10 mg Once       I/O (24Hr):     Intake/Output Summary (Last 24 hours) at 11/10/17 2013  Last data filed at 11/10/17 1303   Gross per 24 hour   Intake                0 ml   Output             1550 ml   Net            -1550 ml       Labs:  Hematology:  Recent Labs      11/08/17   0756  11/09/17   0602  11/10/17   0627   WBC  11.9*  10.7  9.1   HGB  12.0* 10.3*  9.9*   HCT  35.8*  31.9*  30.3*   PLT  256  191  182   INR   --   1.1   --      Chemistry:  Recent Labs      11/08/17   0756  11/09/17   0602  11/10/17   0627   NA  136  133*  138   K  4.0  3.7  3.5*   CL  99  100  103   CO2  28  24  25   GLUCOSE  179*  149*  155*   BUN  13  9  8   CREATININE  0.66*  0.60*  0.59*   MG   --    --   1.9   CALCIUM  8.4*  8.3*  8.3*     Recent Labs      11/09/17   0602  11/10/17   0627   LABA1C  6.0   --    TSH   --   0.95   CHOL  98   --    TRIG  71   --    HDL  36*   --      Recent Labs      11/09/17   2137  11/10/17   0901  11/10/17   1232  11/10/17   1632   POCGLU  158*  151*  152*  171*       Assessment:  Primary Problem  Occipital infarction [facility])     Active Hospital Problems    Diagnosis Date Noted    Hypokalemia [E87.6] 11/10/2017    Paroxysmal atrial fibrillation (HCC) [I48.0]     Occipital infarction ([address] [I63.9] 11/09/2017    Stenosis of right vertebral artery [I65.01] 11/09/2017    Hypertension [I10]     Controlled type 2 diabetes mellitus without complication, with long-term current use of insulin ([address] [E11.9, Z79.4] 11/08/2017    Dyslipidemia [E78.5] 11/08/2017    Essential hypertension [I10] 11/08/2017     Past Medical History:   Diagnosis Date    Arthritis     Diabetes ([address]     Hearing aid worn     bilateral    Inupiat (hard of hearing)     Wears bilateral Hearing Aids    Hyperlipidemia     Hypertension     Occipital infarction ([address] 11/9/2017        Plan:  Neurology evaluation - Angio  ASA / Eliquis on hold  Cardiology consultation - AMY/cardioversion  EPC  Will monitor and control Blood Pressure  Will monitor and control Diabetes  Physical therapy and rehabilitation   Social Service consult for discharge planning    IP CONSULT TO NEUROLOGY  IP CONSULT TO SOCIAL WORK  IP CONSULT TO CARDIOLOGY    Electronically signed by Jcarlos Ferrari DO on 11/10/2017 at 8:13 PM

## 2017-11-10 NOTE — PROGRESS NOTES
Pt transferred to [address]. Report given to [facility] All questions answered.

## 2017-11-10 NOTE — PROGRESS NOTES
Orientation: Left  Pain Descriptors: Aching  Pain Frequency: Continuous  Pain Onset: On-going  Clinical Progression: Gradually worsening  Vital Signs  Patient Currently in Pain: Yes       Orientation  Orientation  Overall Orientation Status: Impaired  Orientation Level: Oriented to person;Disoriented to situation;Disoriented to time;Disoriented to place (pt initially disoriented, improved with cueing)  Objective   Bed mobility  Supine to Sit: Minimal assistance  Sit to Supine: Moderate assistance  Scooting: Moderate assistance  Transfers  Sit to Stand: Minimal Assistance (in [address] [place] [address])  Stand to sit: Minimal Assistance  Lateral Transfers: Dependent/Total ([facility]--able to move with just one person, pt able to maintain balance)  Ambulation  Ambulation?: No  Stairs/Curb  Stairs?: No     Balance  Posture: Fair  Sitting - Static: Fair  Sitting - Dynamic: Fair  Standing - Static: Fair  Standing - Dynamic: Poor  Other exercises  Other exercises 1: ex per CHRISTIANO protocol x 10 reps (no SLR), A/SHASHANK (LLE)   Worked on wt shift L/R in [facility] both in sitting and standing with min A+1   Assessment    Pt with less pushing today. Still tends to lean to the right, definite R sided neglect. Possible angiogram with stent placement later today. Body structures, Functions, Activity limitations: Decreased functional mobility ; Decreased ADL status; Decreased ROM; Decreased strength;Decreased safe awareness;Decreased cognition;Decreased endurance;Decreased balance;Decreased coordination;Decreased fine motor control  Prognosis: Good  Decision Making: Medium Complexity  REQUIRES PT FOLLOW UP: Yes  Activity Tolerance  Activity Tolerance: Treatment limited secondary to medical complications (free text) (pt has developed \"new\" A-flutter per RN; HR up into upper 130's with activity; RN aware)  PT Equipment Recommendations  Equipment Needed:  (TBD )       Discharge Recommendations:  IP Rehab      Goals  Short term goals  Time Frame for Short term goals: 14 visits  Short term goal 1: Patient will complete all bed mobility tasks w/ SBA  Short term goal 2: Patient will complete functional transfers w/ SBA  Short term goal 3: Patient will ambulate 50ft w/ least restrictive device w/ Evelina  Short term goal 4: Patient will tolerate 20+ min of therapeutic exercise/activity w/ therapy BID    Plan    Plan  Times per week: BID (CHRISTIANO)  Times per day: Twice a day  Current Treatment Recommendations: Strengthening, ROM, Balance Training, Functional Mobility Training, Transfer Training, Cognitive/Perceptual Training, Endurance Training, Gait Training, Stair training, Neuromuscular Re-education, Cognitive Reorientation, Pain Management, Home Exercise Program, Safety Education & Training, Patient/Caregiver Education & Training, Equipment Evaluation, Education, & procurement, Positioning  Safety Devices  Type of devices: Bed alarm in place, Gait belt, Patient at risk for falls, Left in bed, Telesitter in use, Nurse notified  Restraints  Initially in place: No     Therapy Time   Individual Concurrent Group Co-treatment   Time In 0910         Time Out 1002         Minutes 52                 Jaret Fink, [address] [facility]

## 2017-11-10 NOTE — CONSULTS
Attestation signed by      Attending Physician Statement:    I have discussed the care of  Jonnathan Flowers , including pertinent history and exam findings, with the Cardiology fellow/resident. I have seen and examined the patient and the key elements of all parts of the encounter have been performed by me. I agree with the assessment, plan and orders as documented by the fellow/resident, after I modified exam findings and plan of treatments, and the final version is my approved version of the assessment. Additional Comments: The patient was seen and examined, agree with below, was recently diagnosed to have atrial fibrillation, was on ELiquis, had hip replacement and Eliquis was on hold, postoperatively he had acute CVA. Currently denies any chest pain or SOB, no dizziness or syncope. Will increase lopressor and add Digoxin for better HR control, consider starting anticoagulation per neurology, will obtain echo to assess for any wall motion abnormalities. Will benefit from AMY/CV which can be done as outpatient ,            [facility] Cardiology Cardiology    Consult / H&P               Today's Date: 11/10/2017  Patient Name: Jonnathan Flowers  Date of admission: 11/8/2017  8:27 PM  Patient's age: 67 y.o., 1945  Admission Dx: Stroke determined by clinical assessment [facility]) [I63.9]    Reason for Consult:  Cardiac evaluation    Requesting Physician: Marichuy Headley DO    CHIEF COMPLAINT:  Acute confusion     History Obtained From:  Chart     HISTORY OF PRESENT ILLNESS:      The patient is a 67 y.o.  male who is admitted to the hospital for acute confusion . Patient had a THR at [facility] on 11/7. Post opt on day 1 , patient became confused and had difficulty speaking. Patient had a CT head and showed left occipital stroke. Patient was transferred for [facility] for further evaluation. On further imaging , CTA head and neck has showed - he has vertebral artery stenosis.     Patient was seen TID PRN  insulin lispro (HUMALOG) injection vial 0-12 Units, 0-12 Units, Subcutaneous, TID WC  insulin lispro (HUMALOG) injection vial 0-6 Units, 0-6 Units, Subcutaneous, Nightly  LORazepam (ATIVAN) tablet 0.5 mg, 0.5 mg, Oral, Q4H PRN  metoprolol succinate (TOPROL XL) extended release tablet 50 mg, 50 mg, Oral, Daily  sodium chloride flush 0.9 % injection 10 mL, 10 mL, Intravenous, PRN  tamsulosin (FLOMAX) capsule 0.4 mg, 0.4 mg, Oral, Nightly  labetalol (NORMODYNE;TRANDATE) injection 10 mg, 10 mg, Intravenous, Once    Allergies:  Review of patient's allergies indicates no known allergies. Social History:   reports that he has never smoked. He has never used smokeless tobacco. He reports that he drinks alcohol. He reports that he does not use drugs. Family History: family history includes Heart Attack in his mother; Stroke in his father and mother. No h/o sudden cardiac death. REVIEW OF SYSTEMS:    · Constitutional: there has been no unanticipated weight loss. There's been No change in energy level, No change in activity level. · Eyes: No visual changes or diplopia. No scleral icterus. · ENT: No Headaches  · Cardiovascular:  Patient was diagnosed with Afib prior to [facility]   · Respiratory: No previous pulmonary problems, No cough  · Gastrointestinal: No abdominal pain. No change in bowel or bladder habits. · Genitourinary: No dysuria, trouble voiding, or hematuria. · Musculoskeletal:  No gait disturbance, No weakness or joint complaints. Neurological: confusion       PHYSICAL EXAM:      /88   Pulse 115   Temp 97.4 °F (36.3 °C) (Oral)   Resp 24   Ht 6' 1\" (1.854 m)   Wt 204 lb 2.3 oz (92.6 kg)   SpO2 98%   BMI 26.93 kg/m²    Constitutional and General Appearance: alert, cooperative, no distress and appears stated age  HEENT: PERRL, no cervical lymphadenopathy. No masses palpable.  Normal oral mucosa  Respiratory:  · Normal excursion and expansion without use of accessory muscles  · Resp Auscultation: Good respiratory effort. No for increased work of breathing. On auscultation: clear to auscultation bilaterally  Cardiovascular:  · The apical impulse is not displaced  · Regularly irregular rhythm . · Jugular venous pulsation Normal  · The carotid upstroke is normal in amplitude and contour without delay or bruit  · Peripheral pulses are symmetrical and full   Abdomen:   · No masses or tenderness  · Bowel sounds present  Extremities:  ·  No Cyanosis or Clubbing  ·  Lower extremity edema: No  ·  Skin: Warm and dry  Neurological:  · Alert but not oriedted to time, place   · Moves all extremities well      DATA:    Diagnostics:    EKG: Aflutter . Labs:     CBC:   Recent Labs      11/09/17   0602  11/10/17   0627   WBC  10.7  9.1   HGB  10.3*  9.9*   HCT  31.9*  30.3*   PLT  191  182     BMP:   Recent Labs      11/09/17   0602  11/10/17   0627   NA  133*  138   K  3.7  3.5*   CO2  24  25   BUN  9  8   CREATININE  0.60*  0.59*   LABGLOM  >60  >60   GLUCOSE  149*  155*     BNP: No results for input(s): BNP in the last 72 hours. PT/INR:   Recent Labs      11/09/17 0602   PROTIME  11.9   INR  1.1     APTT:  Recent Labs      11/09/17 0602   APTT  28.0     CARDIAC ENZYMES:No results for input(s): CKTOTAL, CKMB, CKMBINDEX, TROPONINI in the last 72 hours. FASTING LIPID PANEL:  Lab Results   Component Value Date    HDL 36 11/09/2017    TRIG 71 11/09/2017     LIVER PROFILE:No results for input(s): AST, ALT, LABALBU in the last 72 hours. IMPRESSION:    Patient Active Problem List   Diagnosis    Acute metabolic encephalopathy    Arthralgia of left hip    Controlled type 2 diabetes mellitus without complication, with long-term current use of insulin ([address]    Essential hypertension    Dyslipidemia    Occipital infarction ([address]    Stenosis of right vertebral artery    Hypertension       1. A Flutter   2. Acute left occipital infarct   3. Left THR   4. HTN   5. Dyslipidemia  6.  Pre diabetes      RECOMMENDATIONS:  1. Anticoagulate per neurology  2, Rate control. Digoxin and lopressor. Discussed with patient and Nurse.     Electronically signed by Renee Haynes MD on 11/10/2017 at 9:42 [address] [place] [address] [facility]      [phone]

## 2017-11-10 NOTE — PROGRESS NOTES
malnutrition  1718.49 Mild malnutrition  18.524.9 Normal  2529.9 Overweight (not obese)  3034.9 Obese class 1 (Low Risk)  3539.9 Obese class 2 (Moderate Risk)  ? 40 Obese class 3 (High Risk)    Labs (last 48 hours):  Recent Results (from the past 48 hour(s))   POC Glucose Fingerstick    Collection Time: 11/08/17 11:36 AM   Result Value Ref Range    POC Glucose 176 (H) 75 - 110 mg/dL   POC Glucose Fingerstick    Collection Time: 11/08/17  4:50 PM   Result Value Ref Range    POC Glucose 169 (H) 75 - 110 mg/dL   MRSA DNA Probe, Nasal    Collection Time: 11/08/17  9:40 PM   Result Value Ref Range    Specimen Description . NASAL SWAB     MRSA, DNA, Nasal       NEGATIVE:  MRSA DNA not detected by nucleic acid amplification.    POC Glucose Fingerstick    Collection Time: 11/08/17 10:35 PM   Result Value Ref Range    POC Glucose 174 (H) 75 - 110 mg/dL   PROTIME-INR    Collection Time: 11/09/17  6:02 AM   Result Value Ref Range    Protime 11.9 9.4 - 12.6 sec    INR 1.1    APTT    Collection Time: 11/09/17  6:02 AM   Result Value Ref Range    PTT 28.0 21.3 - 31.3 sec   CBC WITH AUTO DIFFERENTIAL    Collection Time: 11/09/17  6:02 AM   Result Value Ref Range    WBC 10.7 3.5 - 11.3 k/uL    RBC 3.48 (L) 4.21 - 5.77 m/uL    Hemoglobin 10.3 (L) 13.0 - 17.0 g/dL    Hematocrit 31.9 (L) 40.7 - 50.3 %    MCV 91.7 82.6 - 102.9 fL    MCH 29.6 25.2 - 33.5 pg    MCHC 32.3 28.4 - 34.8 g/dL    RDW 12.1 11.8 - 14.4 %    Platelets 043 409 - 392 k/uL    MPV 9.6 8.1 - 13.5 fL    Differential Type NOT REPORTED     WBC Morphology NOT REPORTED     RBC Morphology NOT REPORTED     Platelet Estimate NOT REPORTED     Seg Neutrophils 79 (H) 36 - 65 %    Lymphocytes 11 (L) 24 - 43 %    Monocytes 9 3 - 12 %    Eosinophils % 0 (L) 1 - 4 %    Basophils 0 0 - 2 %    Immature Granulocytes 1 (H) 0 %    Segs Absolute 8.42 (H) 1.50 - 8.10 k/uL    Absolute Lymph # 1.18 1.10 - 3.70 k/uL    Absolute Mono # 1.00 0.10 - 1.20 k/uL    Absolute Eos # <0.03 0.00 - 0.44 k/uL    Basophils # 0.04 0.00 - 0.20 k/uL    Absolute Immature Granulocyte 0.05 0.00 - 0.30 k/uL   BASIC METABOLIC PANEL    Collection Time: 11/09/17  6:02 AM   Result Value Ref Range    Glucose 149 (H) 70 - 99 mg/dL    BUN 9 8 - 23 mg/dL    CREATININE 0.60 (L) 0.70 - 1.20 mg/dL    Bun/Cre Ratio NOT REPORTED 9 - 20    Calcium 8.3 (L) 8.6 - 10.4 mg/dL    Sodium 133 (L) 135 - 144 mmol/L    Potassium 3.7 3.7 - 5.3 mmol/L    Chloride 100 98 - 107 mmol/L    CO2 24 20 - 31 mmol/L    Anion Gap 9 9 - 17 mmol/L    GFR Non-African American >60 >60 mL/min    GFR African American >60 >60 mL/min    GFR Comment          GFR Staging NOT REPORTED    Hemoglobin A1C    Collection Time: 11/09/17  6:02 AM   Result Value Ref Range    Hemoglobin A1C 6.0 4.0 - 6.0 %    Estimated Avg Glucose 126 mg/dL   Lipid Panel    Collection Time: 11/09/17  6:02 AM   Result Value Ref Range    Cholesterol 98 <200 mg/dL    HDL 36 (L) >40 mg/dL    LDL Cholesterol 48 0 - 130 mg/dL    Chol/HDL Ratio 2.7 <5    Triglycerides 71 <150 mg/dL    VLDL NOT REPORTED 1 - 30 mg/dL   POC Glucose Fingerstick    Collection Time: 11/09/17  9:11 AM   Result Value Ref Range    POC Glucose 154 (H) 75 - 110 mg/dL   POC Glucose Fingerstick    Collection Time: 11/09/17 11:35 AM   Result Value Ref Range    POC Glucose 142 (H) 75 - 110 mg/dL   POC Glucose Fingerstick    Collection Time: 11/09/17  5:09 PM   Result Value Ref Range    POC Glucose 153 (H) 75 - 110 mg/dL   POC Glucose Fingerstick    Collection Time: 11/09/17  9:37 PM   Result Value Ref Range    POC Glucose 158 (H) 75 - 110 mg/dL   EKG 12 Lead    Collection Time: 11/09/17 10:17 PM   Result Value Ref Range    Ventricular Rate 98 BPM    Atrial Rate 322 BPM    QRS Duration 88 ms    Q-T Interval 340 ms    QTc Calculation (Bazett) 434 ms    P Axis -88 degrees    R Axis 27 degrees    T Axis 64 degrees   CBC WITH AUTO DIFFERENTIAL    Collection Time: 11/10/17  6:27 AM   Result Value Ref Range    WBC 9.1 3.5 - 11.3 k/uL RBC 3.31 (L) 4.21 - 5.77 m/uL    Hemoglobin 9.9 (L) 13.0 - 17.0 g/dL    Hematocrit 30.3 (L) 40.7 - 50.3 %    MCV 91.5 82.6 - 102.9 fL    MCH 29.9 25.2 - 33.5 pg    MCHC 32.7 28.4 - 34.8 g/dL    RDW 12.0 11.8 - 14.4 %    Platelets 722 934 - 085 k/uL    MPV 10.1 8.1 - 13.5 fL    Differential Type NOT REPORTED     WBC Morphology NOT REPORTED     RBC Morphology NOT REPORTED     Platelet Estimate NOT REPORTED     Seg Neutrophils 80 (H) 36 - 65 %    Lymphocytes 11 (L) 24 - 43 %    Monocytes 9 3 - 12 %    Eosinophils % 0 (L) 1 - 4 %    Basophils 0 0 - 2 %    Immature Granulocytes 1 (H) 0 %    Segs Absolute 7.29 1.50 - 8.10 k/uL    Absolute Lymph # 0.97 (L) 1.10 - 3.70 k/uL    Absolute Mono # 0.78 0.10 - 1.20 k/uL    Absolute Eos # <0.03 0.00 - 0.44 k/uL    Basophils # <0.03 0.00 - 0.20 k/uL    Absolute Immature Granulocyte 0.05 0.00 - 0.30 k/uL   BASIC METABOLIC PANEL    Collection Time: 11/10/17  6:27 AM   Result Value Ref Range    Glucose 155 (H) 70 - 99 mg/dL    BUN 8 8 - 23 mg/dL    CREATININE 0.59 (L) 0.70 - 1.20 mg/dL    Bun/Cre Ratio NOT REPORTED 9 - 20    Calcium 8.3 (L) 8.6 - 10.4 mg/dL    Sodium 138 135 - 144 mmol/L    Potassium 3.5 (L) 3.7 - 5.3 mmol/L    Chloride 103 98 - 107 mmol/L    CO2 25 20 - 31 mmol/L    Anion Gap 10 9 - 17 mmol/L    GFR Non-African American >60 >60 mL/min    GFR African American >60 >60 mL/min    GFR Comment          GFR Staging NOT REPORTED    MAGNESIUM    Collection Time: 11/10/17  6:27 AM   Result Value Ref Range    Magnesium 1.9 1.6 - 2.6 mg/dL   TSH with Reflex    Collection Time: 11/10/17  6:27 AM   Result Value Ref Range    TSH 0.95 0.30 - 5.00 mIU/L       Labs and imaging reviewed with Dr. Tawanda Flynn. EXAM:  Gen: Alert and cooperative. NAD  CV: RRR  Resp: CTAB  Abd: soft, non-distended  Skin: Cap refill <2 seconds    NEURO EXAM:  Alert and Oriented to person, but not time or place. Communicating appropriately with family. Follows all commands. Strength 5/5 in all extremities.  No sensory deficits noted. Pupils 3mm and reactive bilaterally. EOMI. No tongue deviation. Face is symmetric. Right hemianopsia and partial right sided neglect noted. PLAN:  Neuro:  -Plan for angio today with neuroendovascular   - Pending CTA head and neck   - Hold Eliquis until day 5 and then will consider restarting at that time.   - Neuro checks per protocol     CARDIOVASCULAR:  - home antihypertensives  - New onset aflutter, ECHO ordered. Cardio consulted and recommend: AMY/CV done as OP. Started on Digoxin and lopressor  - Prn antihypertensives for SBP goal 110-180  - On ASA and lovenox     PULMONARY:  - saturating well on 2L NC     RENAL/FLUID/ELECTROLYTE:  - 125 ml/hr NS  - replace lytes PRN  - Strict I/O's     GI/NUTRITION:  NUTRITION:  Diet NPO, After Midnight Exceptions are: Sips with Meds. Will advance after angio     ENDOCRINE:  PROBLEMS:  - hyperglycemia  PLAN:   - monitor blood glucose  - ISS     OTHER:  - Ortho to follow for patients L hip replacement, POD#4     PROPHYLAXIS:              Stress ulcer: H2 blocker              VTE: SCDs     DISPOSITION:              Continue ICU      Please contact neurosurgery / neuro critical care with any changes.        Gabbi Fisher DO, EM2  Neuro Critical Care  Pager [phone]  11/10/2017  9:07 AM

## 2017-11-10 NOTE — FLOWSHEET NOTE
11/10/17 1531   Encounter Summary   Services provided to: Patient and family together   Continue Visiting (11/10/17)   Complexity of Encounter Low   Length of Encounter 15 minutes   Spiritual Assessment Completed Yes   Routine   Type Follow up     I visited the patient for [employer] ceremony. I prayed for the patient.

## 2017-11-11 LAB
ABSOLUTE EOS #: 0.04 K/UL (ref 0–0.44)
ABSOLUTE IMMATURE GRANULOCYTE: 0.04 K/UL (ref 0–0.3)
ABSOLUTE LYMPH #: 1.08 K/UL (ref 1.1–3.7)
ABSOLUTE MONO #: 0.82 K/UL (ref 0.1–1.2)
ANION GAP SERPL CALCULATED.3IONS-SCNC: 15 MMOL/L (ref 9–17)
BASOPHILS # BLD: 0 % (ref 0–2)
BASOPHILS ABSOLUTE: <0.03 K/UL (ref 0–0.2)
BUN BLDV-MCNC: 8 MG/DL (ref 8–23)
BUN/CREAT BLD: ABNORMAL (ref 9–20)
CALCIUM SERPL-MCNC: 8.7 MG/DL (ref 8.6–10.4)
CHLORIDE BLD-SCNC: 103 MMOL/L (ref 98–107)
CO2: 22 MMOL/L (ref 20–31)
CREAT SERPL-MCNC: 0.6 MG/DL (ref 0.7–1.2)
DIFFERENTIAL TYPE: ABNORMAL
EOSINOPHILS RELATIVE PERCENT: 0 % (ref 1–4)
GFR AFRICAN AMERICAN: >60 ML/MIN
GFR NON-AFRICAN AMERICAN: >60 ML/MIN
GFR SERPL CREATININE-BSD FRML MDRD: ABNORMAL ML/MIN/{1.73_M2}
GFR SERPL CREATININE-BSD FRML MDRD: ABNORMAL ML/MIN/{1.73_M2}
GLUCOSE BLD-MCNC: 119 MG/DL (ref 75–110)
GLUCOSE BLD-MCNC: 151 MG/DL (ref 75–110)
GLUCOSE BLD-MCNC: 165 MG/DL (ref 75–110)
GLUCOSE BLD-MCNC: 166 MG/DL (ref 70–99)
GLUCOSE BLD-MCNC: 189 MG/DL (ref 75–110)
HCT VFR BLD CALC: 32.3 % (ref 40.7–50.3)
HEMOGLOBIN: 10.4 G/DL (ref 13–17)
IMMATURE GRANULOCYTES: 0 %
LYMPHOCYTES # BLD: 12 % (ref 24–43)
MCH RBC QN AUTO: 29.1 PG (ref 25.2–33.5)
MCHC RBC AUTO-ENTMCNC: 32.2 G/DL (ref 28.4–34.8)
MCV RBC AUTO: 90.2 FL (ref 82.6–102.9)
MONOCYTES # BLD: 9 % (ref 3–12)
PDW BLD-RTO: 11.8 % (ref 11.8–14.4)
PLATELET # BLD: 256 K/UL (ref 138–453)
PLATELET ESTIMATE: ABNORMAL
PMV BLD AUTO: 9.2 FL (ref 8.1–13.5)
POTASSIUM SERPL-SCNC: 3.7 MMOL/L (ref 3.7–5.3)
RBC # BLD: 3.58 M/UL (ref 4.21–5.77)
RBC # BLD: ABNORMAL 10*6/UL
SEG NEUTROPHILS: 78 % (ref 36–65)
SEGMENTED NEUTROPHILS ABSOLUTE COUNT: 7.14 K/UL (ref 1.5–8.1)
SODIUM BLD-SCNC: 140 MMOL/L (ref 135–144)
WBC # BLD: 9.1 K/UL (ref 3.5–11.3)
WBC # BLD: ABNORMAL 10*3/UL

## 2017-11-11 PROCEDURE — 97530 THERAPEUTIC ACTIVITIES: CPT

## 2017-11-11 PROCEDURE — 97116 GAIT TRAINING THERAPY: CPT

## 2017-11-11 PROCEDURE — 6360000002 HC RX W HCPCS: Performed by: INTERNAL MEDICINE

## 2017-11-11 PROCEDURE — 6370000000 HC RX 637 (ALT 250 FOR IP): Performed by: INTERNAL MEDICINE

## 2017-11-11 PROCEDURE — 6370000000 HC RX 637 (ALT 250 FOR IP): Performed by: PSYCHIATRY & NEUROLOGY

## 2017-11-11 PROCEDURE — 36415 COLL VENOUS BLD VENIPUNCTURE: CPT

## 2017-11-11 PROCEDURE — 2580000003 HC RX 258: Performed by: INTERNAL MEDICINE

## 2017-11-11 PROCEDURE — 99232 SBSQ HOSP IP/OBS MODERATE 35: CPT | Performed by: INTERNAL MEDICINE

## 2017-11-11 PROCEDURE — 2500000003 HC RX 250 WO HCPCS: Performed by: EMERGENCY MEDICINE

## 2017-11-11 PROCEDURE — 82947 ASSAY GLUCOSE BLOOD QUANT: CPT

## 2017-11-11 PROCEDURE — 97110 THERAPEUTIC EXERCISES: CPT

## 2017-11-11 PROCEDURE — S0028 INJECTION, FAMOTIDINE, 20 MG: HCPCS | Performed by: EMERGENCY MEDICINE

## 2017-11-11 PROCEDURE — 99233 SBSQ HOSP IP/OBS HIGH 50: CPT | Performed by: PSYCHIATRY & NEUROLOGY

## 2017-11-11 PROCEDURE — 6370000000 HC RX 637 (ALT 250 FOR IP): Performed by: EMERGENCY MEDICINE

## 2017-11-11 PROCEDURE — 85025 COMPLETE CBC W/AUTO DIFF WBC: CPT

## 2017-11-11 PROCEDURE — 94762 N-INVAS EAR/PLS OXIMTRY CONT: CPT

## 2017-11-11 PROCEDURE — 80048 BASIC METABOLIC PNL TOTAL CA: CPT

## 2017-11-11 PROCEDURE — 2060000000 HC ICU INTERMEDIATE R&B

## 2017-11-11 RX ORDER — FAMOTIDINE 20 MG/1
20 TABLET, FILM COATED ORAL 2 TIMES DAILY
Status: DISCONTINUED | OUTPATIENT
Start: 2017-11-11 | End: 2017-11-13 | Stop reason: HOSPADM

## 2017-11-11 RX ORDER — ASPIRIN 81 MG/1
324 TABLET, CHEWABLE ORAL DAILY
Qty: 30 TABLET | Refills: 3 | Status: SHIPPED | OUTPATIENT
Start: 2017-11-12 | End: 2017-12-13 | Stop reason: ALTCHOICE

## 2017-11-11 RX ORDER — DIGOXIN 250 MCG
250 TABLET ORAL DAILY
Qty: 30 TABLET | Refills: 3 | Status: SHIPPED | OUTPATIENT
Start: 2017-11-12

## 2017-11-11 RX ADMIN — FAMOTIDINE 20 MG: 10 INJECTION, SOLUTION INTRAVENOUS at 08:47

## 2017-11-11 RX ADMIN — DIGOXIN 250 MCG: 0.25 TABLET ORAL at 08:49

## 2017-11-11 RX ADMIN — DOCUSATE SODIUM 100 MG: 100 CAPSULE ORAL at 21:47

## 2017-11-11 RX ADMIN — Medication 10 ML: at 08:49

## 2017-11-11 RX ADMIN — ENOXAPARIN SODIUM 40 MG: 40 INJECTION SUBCUTANEOUS at 08:48

## 2017-11-11 RX ADMIN — INSULIN LISPRO 2 UNITS: 100 INJECTION, SOLUTION INTRAVENOUS; SUBCUTANEOUS at 08:49

## 2017-11-11 RX ADMIN — METOPROLOL TARTRATE 50 MG: 50 TABLET, FILM COATED ORAL at 08:47

## 2017-11-11 RX ADMIN — FAMOTIDINE 20 MG: 20 TABLET, FILM COATED ORAL at 21:47

## 2017-11-11 RX ADMIN — DIGOXIN 250 MCG: 0.25 INJECTION INTRAMUSCULAR; INTRAVENOUS at 00:43

## 2017-11-11 RX ADMIN — TAMSULOSIN HYDROCHLORIDE 0.4 MG: 0.4 CAPSULE ORAL at 21:47

## 2017-11-11 RX ADMIN — ASPIRIN 324 MG: 81 TABLET, CHEWABLE ORAL at 08:48

## 2017-11-11 RX ADMIN — INSULIN LISPRO 2 UNITS: 100 INJECTION, SOLUTION INTRAVENOUS; SUBCUTANEOUS at 17:57

## 2017-11-11 RX ADMIN — METOPROLOL TARTRATE 50 MG: 50 TABLET, FILM COATED ORAL at 21:47

## 2017-11-11 RX ADMIN — INSULIN LISPRO 2 UNITS: 100 INJECTION, SOLUTION INTRAVENOUS; SUBCUTANEOUS at 12:42

## 2017-11-11 RX ADMIN — ATORVASTATIN CALCIUM 40 MG: 40 TABLET, FILM COATED ORAL at 21:47

## 2017-11-11 RX ADMIN — DOCUSATE SODIUM 100 MG: 100 CAPSULE ORAL at 08:47

## 2017-11-11 RX ADMIN — Medication 10 ML: at 21:49

## 2017-11-11 ASSESSMENT — ENCOUNTER SYMPTOMS
DOUBLE VISION: 0
CONSTIPATION: 0
COUGH: 0
WHEEZING: 0
SPUTUM PRODUCTION: 0
BLURRED VISION: 1
PHOTOPHOBIA: 0
VOMITING: 0
SHORTNESS OF BREATH: 0
NAUSEA: 0
BACK PAIN: 0
BLOOD IN STOOL: 0

## 2017-11-11 ASSESSMENT — PAIN SCALES - GENERAL
PAINLEVEL_OUTOF10: 5
PAINLEVEL_OUTOF10: 0

## 2017-11-11 NOTE — PLAN OF CARE
LABS:   Hematology:  Recent Labs      11/09/17   0602  11/10/17   0627  11/11/17   0828   WBC  10.7  9.1  9.1   HGB  10.3*  9.9*  10.4*   HCT  31.9*  30.3*  32.3*   PLT  191  182  256   INR  1.1   --    --      Chemistry:  Recent Labs      11/09/17   0602  11/10/17   0627  11/11/17   0828   NA  133*  138  140   K  3.7  3.5*  3.7   CL  100  103  103   CO2  24  25  22   GLUCOSE  149*  155*  166*   BUN  9  8  8   CREATININE  0.60*  0.59*  0.60*   MG   --   1.9   --    CALCIUM  8.3*  8.3*  8.7     Recent Labs      11/09/17   0602  11/10/17   0627   LABA1C  6.0   --    TSH   --   0.95   CHOL  98   --    TRIG  71   --    HDL  36*   --        PROBLEMS:  Principal Problem:    Occipital infarction (HCC)  Active Problems:    Controlled type 2 diabetes mellitus without complication, with long-term current use of insulin (HCC)    Essential hypertension    Dyslipidemia    Stenosis of right vertebral artery    Hypertension    Paroxysmal atrial fibrillation (HCC)    Hypokalemia    Cerebellar infarct (HCC) - right       UPDATED PLAN:  See current orders  Neurology evaluation  ASA / Eliquis on hold - on hold for 1 week  Cardiology consultation - AMY/cardioversion planned on an outpatient basis  EPCs  Will monitor and control Blood Pressure  Will monitor and control Diabetes  Physical therapy and rehabilitation   Social Service consult for discharge planning    The patient's status and plan have been discussed with the patient and family at the bedside   They report that the patient's baseline does include some memory issues and confusions  Since the acute event he has been much more confused than normal  No prior history of hallucinations  They do plan on placement  Discharge planning  Will discharge when arrangements complete and ok with other services.   Follow-up with PCP in one week, Tad Olivo MD  Notify PCP of discharge     IP CONSULT TO NEUROLOGY  IP CONSULT TO SOCIAL WORK  IP CONSULT TO [address] [place] [address]

## 2017-11-11 NOTE — PROGRESS NOTES
aspirin  324 mg Oral Daily    metoprolol tartrate  50 mg Oral BID    digoxin  250 mcg Oral Daily    famotidine (PEPCID) injection  20 mg Intravenous BID    enoxaparin  40 mg Subcutaneous Daily    docusate sodium  100 mg Oral BID    sodium chloride flush  10 mL Intravenous 2 times per day    atorvastatin  40 mg Oral Nightly    insulin lispro  0-12 Units Subcutaneous TID WC    insulin lispro  0-6 Units Subcutaneous Nightly    tamsulosin  0.4 mg Oral Nightly    labetalol  10 mg Intravenous Once     Continuous Infusions:   sodium chloride 75 mL/hr at 11/09/17 1620    dextrose       PRN Meds:.potassium chloride **OR** potassium chloride **OR** potassium chloride, acetaminophen, ondansetron, sodium chloride flush, dextrose, dextrose, glucagon (rDNA), glucose, HYDROcodone 5 mg - acetaminophen, hydrOXYzine, LORazepam, sodium chloride flush    CONSTITUTIONAL: AOx3, no apparent distress, appears stated age   HEAD: normocephalic, atraumatic   EYES: PERRLA, EOMI   ENT: moist mucous membranes, uvula midline   NECK: symmetric, no midline tenderness to palpation   LUNGS: clear to auscultation bilaterally   CARDIOVASCULAR: regular rate and rhythm, sm  murmurs, rubs or gallops   ABDOMEN: Soft, non-tender, non-distended with normal active bowel sounds   SKIN: no rash       EKG: AF   ECHO:11/10/17  Awaiting the result  Ejection fraction: %  Stress Test: not obtained. Cardiac Angiography: not obtained.             Assessment / Acute Cardiac Problems:       Patient Active Problem List:     Acute metabolic encephalopathy     Arthralgia of left hip     Controlled type 2 diabetes mellitus without complication, with long-term current use of insulin (HCC)     Essential hypertension     Dyslipidemia     Occipital infarction ([address]     Stenosis of right vertebral artery     Hypertension     Paroxysmal atrial fibrillation (HCC)     Hypokalemia      Plan of Treatment:   AF , NOW IN NSR , CONTINUE BB, DIG   ANTICOAGULATION TO START WHEN OK BY NEUROLOGY   PT WAS ON ELIQUIS 2.5 BID BEFORE THE HIP SURGRY. HTN WELL CONTROLLED   CONTINUE REST .      [place], [facility] Cardiology  [phone]

## 2017-11-11 NOTE — PROGRESS NOTES
Occupational [address]  Occupational Therapy Not Seen Note    Patient not available for Occupational Therapy due to:    [] Testing:    [] Hemodialysis    [] Cancelled by RN:    []Refusal by Patient:    [] Surgery:     [] Intubation:     [] Pain Medication:    [] Sedation:     [] Spine Precautions :    [] Medical Instability:    [x] Other:    RN stated pt was with PT staff at this time

## 2017-11-11 NOTE — PROGRESS NOTES
Endovascular Neurosurgery Progress note    Pt Name: Kae Salazar  MRN: [id number]  [place]: 1945  Date of evaluation: 11/11/2017  Primary Care Physician: Anai Horne MD  Reason for evaluation: PCA stroke and VA stenosis    SUBJECTIVE:   Patient denies any new complains. Allergies  has No Known Allergies. Medications  Prior to Admission medications    Medication Sig Start Date End Date Taking? Authorizing Provider   docusate sodium (COLACE) 100 MG capsule Take 1 capsule by mouth 2 times daily 11/8/17   Mayra Martinez MD   oxyCODONE-acetaminophen (PERCOCET) 5-325 MG per tablet Take 1-2 tablets by mouth every 4 hours as needed for Pain .  11/8/17 11/15/17  Mayra Martinez MD   ondansetron [facility]) 4 MG tablet Take 1 tablet by mouth every 6 hours as needed for Nausea 11/8/17   Mayra Martinze MD   apixaban [facility]) 2.5 MG TABS tablet Take 1 tablet by mouth 2 times daily 11/8/17 12/8/17  Mayra Martinez MD   metFORMIN (GLUCOPHAGE) 500 MG tablet Take 500 mg by mouth 2 times daily (with meals)    Historical Provider, MD   atorvastatin (LIPITOR) 40 MG tablet Take 40 mg by mouth nightly    Historical Provider, MD   gabapentin (NEURONTIN) 300 MG capsule Take 300 mg by mouth 2 times daily    Historical Provider, MD   amLODIPine (NORVASC) 5 MG tablet Take 5 mg by mouth daily    Historical Provider, MD   lisinopril (PRINIVIL;ZESTRIL) 20 MG tablet Take 20 mg by mouth 2 times daily    Historical Provider, MD   Cholecalciferol (VITAMIN D3 PO) Take 1 tablet by mouth 2 times daily    Historical Provider, MD   Insulin NPH Isophane & Regular (HUMULIN 70/30 PEN) (70-30) 100 UNIT per ML injection pen Inject 30 Units into the skin every morning    Historical Provider, MD   Insulin NPH Isophane & Regular (HUMULIN 70/30 PEN) (70-30) 100 UNIT per ML injection pen Inject 22 Units into the skin nightly    Historical Provider, MD   tamsulosin (FLOMAX) 0.4 MG capsule Take 0.4 mg by mouth nightly    Historical Provider, MD HCT 32.3 11/11/2017    MCV 90.2 11/11/2017    MCH 29.1 11/11/2017    MCHC 32.2 11/11/2017    RDW 11.8 11/11/2017     11/11/2017     11/03/2011    MPV 9.2 11/11/2017     BMP:    Lab Results   Component Value Date     11/10/2017    K 3.5 11/10/2017     11/10/2017    CO2 25 11/10/2017    BUN 8 11/10/2017    LABALBU 4.1 11/23/2015    CREATININE 0.59 11/10/2017    CALCIUM 8.3 11/10/2017    GFRAA >60 11/10/2017    LABGLOM >60 11/10/2017    GLUCOSE 155 11/10/2017    GLUCOSE 127 11/03/2011     IMPRESSIONS:   Geo Pitts is a 67 y.o. male with PMH mentioned below had total L hip replacement at [address] on 11/7. Post op, patient was confused for which CT head was performed and showed acute/subacute L occipital lobe infarct. CTA head and neck on 11/8/17 showed moderate to severe stenosis in the right VA origin and distal V4 bilaterally. PLANS:     Left occipital lobe stroke. Repeat CTA head and neck 11/10/17 showed: no focal significant narrowing in the neck for which conventional cerebral angiogram was held. Continue lipitor 40 mg daily and aspirin 324 mg daily. Due to the acute occipital infarct, will hold on anti coagulation secondary to the high risk of stroke hemorrhagic conversion.  Anticoagulation for A fib can be considered one week from stroke diagnosis (11/15/17)    Discussed with Dr Denisa Rich MD  Pager [phone]  Stroke, [facility]  [id number]  Electronically signed 11/11/2017 at 9:02 AM

## 2017-11-11 NOTE — PROGRESS NOTES
Physical Therapy  Facility/Department: [facility] NEURO  Daily Treatment Note  NAME: Vivian Ruby  : 1945  MRN: [id number]    Date of Service: 2017    Patient Diagnosis(es):   Patient Active Problem List    Diagnosis Date Noted    Hypokalemia 11/10/2017    Paroxysmal atrial fibrillation ([address]     Occipital infarction ([address] 2017    Stenosis of right vertebral artery 2017    Hypertension     Acute metabolic encephalopathy     Arthralgia of left hip 2017    Controlled type 2 diabetes mellitus without complication, with long-term current use of insulin ([address] 2017    Essential hypertension 2017    Dyslipidemia 2017       Past Medical History:   Diagnosis Date    Arthritis     Diabetes ([address]     Hearing aid worn     bilateral    [place] (hard of hearing)     Wears bilateral Hearing Aids    Hyperlipidemia     Hypertension     Occipital infarction ([address] 2017     Past Surgical History:   Procedure Laterality Date    BACK SURGERY      lumbar spine    CATARACT REMOVAL Right 2016    CHOLECYSTECTOMY      NECK SURGERY      TOTAL HIP ARTHROPLASTY Left 2017    LEFT HIP TOTAL ARTHROPLASTY ANTERIOR APPROACH     TOTAL HIP ARTHROPLASTY Left 2017    LEFT HIP TOTAL ARTHROPLASTY ANTERIOR APPROACH - BIOMET (TANMAY) MARIA LUISA performed by Barbie Murcia MD at [facility]  Restrictions/Precautions  Restrictions/Precautions: Weight Bearing, General Precautions  Required Braces or Orthoses?: No  Lower Extremity Weight Bearing Restrictions  Left Lower Extremity Weight Bearing: Weight Bearing As Tolerated  Position Activity Restriction  Other position/activity restrictions: Anterior approach CHRISTIANO - no straight leg raises  Subjective   General  Response To Previous Treatment: Patient with no complaints from previous session. Subjective  Subjective: Pt continues to be moderately disoriented and confused, however pleasant.  Trey pain at rest. General Comment  Comments: Per RN, patient okay to mobilize  Pain Assessment  Pain Level: 5  Orientation  Overall Orientation Status: Within Functional Limits  Orientation Level: Oriented to person;Oriented to place;Oriented to situation  Objective   Bed mobility  Scooting: Contact guard assistance  Transfers  Sit to Stand: Contact guard assistance  Stand to sit: Contact guard assistance  Ambulation  Ambulation?: Yes  Ambulation 1  Surface: level tile  Device: Rolling Walker  Assistance: Minimal assistance;Contact guard assistance  Quality of Gait: general unsteadiness, noted R side visual deficits with pt running into large objects on R side, step to gait pattern emphasized leading with LLE  Distance: 45 ft x1  Stairs/Curb  Stairs?: No  Balance  Posture: Fair  Sitting - Static: Fair  Sitting - Dynamic: Fair  Standing - Static: Fair  Standing - Dynamic: Poor  Exercises  Comments: LLE heel slides A-AAROM x10 with manually resisted extension, LLE hip abd/add AAROM x10 in supine, pt education regarding need to perform glute squeezes and LLE quad sets for building strength while spending so much time in bed. Comment: Pt able to have a BM and urinate during today's tx. Pt left in chair with telesitter on, RN aware and watching pt, tray table in front of pt and pt education not to attempt getting out of chair on his own.     Assessment   Activity Tolerance: Patient Tolerated treatment well;Patient limited by cognitive status   Discharge Recommendations:  IP Rehab  Goals  Short term goals  Time Frame for Short term goals: 14 visits  Short term goal 1: Patient will complete all bed mobility tasks w/ SBA  Short term goal 2: Patient will complete functional transfers w/ SBA  Short term goal 3: Patient will ambulate 50ft w/ least restrictive device w/ Evelina  Short term goal 4: Patient will tolerate 20+ min of therapeutic exercise/activity w/ therapy BID  Plan  Times per week: BID (CHRISTIANO)  Times per day: Twice a day  Current Treatment Recommendations: Strengthening, ROM, Balance Training, Functional Mobility Training, Transfer Training, Cognitive/Perceptual Training, Endurance Training, Gait Training, Stair training, Neuromuscular Re-education, Cognitive Reorientation, Pain Management, Home Exercise Program, Safety Education & Training, Patient/Caregiver Education & Training, Equipment Evaluation, Education, & procurement, Positioning  Safety Devices  Type of devices: Bed alarm in place, Gait belt, Patient at risk for falls, Telesitter in use, Nurse notified  Restraints  Initially in place: No     Therapy Time   Individual Concurrent Group Co-treatment   Time In  0900         Time Out  0940         Minutes  [address], PTA

## 2017-11-11 NOTE — PROGRESS NOTES
Physical Therapy  Facility/Department: [facility] NEURO  Daily Treatment Note  NAME: Sofy Ferguson  : 1945  MRN: [id number]    Date of Service: 2017    Patient Diagnosis(es):   Patient Active Problem List    Diagnosis Date Noted    Hypokalemia 11/10/2017    Paroxysmal atrial fibrillation ([address]     Occipital infarction ([address] 2017    Stenosis of right vertebral artery 2017    Hypertension     Acute metabolic encephalopathy     Arthralgia of left hip 2017    Controlled type 2 diabetes mellitus without complication, with long-term current use of insulin ([address] 2017    Essential hypertension 2017    Dyslipidemia 2017       Past Medical History:   Diagnosis Date    Arthritis     Diabetes ([address]     Hearing aid worn     bilateral    [place] (hard of hearing)     Wears bilateral Hearing Aids    Hyperlipidemia     Hypertension     Occipital infarction ([address] 2017     Past Surgical History:   Procedure Laterality Date    BACK SURGERY      lumbar spine    CATARACT REMOVAL Right 2016    CHOLECYSTECTOMY      NECK SURGERY      TOTAL HIP ARTHROPLASTY Left 2017    LEFT HIP TOTAL ARTHROPLASTY ANTERIOR APPROACH     TOTAL HIP ARTHROPLASTY Left 2017    LEFT HIP TOTAL ARTHROPLASTY ANTERIOR APPROACH - BIOMET (TANMAY) MARIA LUISA performed by Fuad Gutierrez MD at [facility]  Restrictions/Precautions  Restrictions/Precautions: Weight Bearing, General Precautions  Required Braces or Orthoses?: No  Lower Extremity Weight Bearing Restrictions  Left Lower Extremity Weight Bearing: Weight Bearing As Tolerated  Position Activity Restriction  Other position/activity restrictions: Anterior approach CHRISTIANO - no straight leg raises  Subjective   General  Response To Previous Treatment: Patient with no complaints from previous session. Subjective  Subjective: Pt appears to have worse confusion this afternoon.   He is looking for keys to his car and does exercise/activity w/ therapy BID  Plan  Times per week: BID (CHRISTIANO)  Times per day: Twice a day  Current Treatment Recommendations: Strengthening, ROM, Balance Training, Functional Mobility Training, Transfer Training, Cognitive/Perceptual Training, Endurance Training, Gait Training, Stair training, Neuromuscular Re-education, Cognitive Reorientation, Pain Management, Home Exercise Program, Safety Education & Training, Patient/Caregiver Education & Training, Equipment Evaluation, Education, & procurement, Positioning  Safety Devices  Type of devices: Bed alarm in place, Gait belt, Patient at risk for falls, Left in bed, Telesitter in use, Nurse notified  Restraints  Initially in place: No     Therapy Time   Individual Concurrent Group Co-treatment   Time In  1300         Time Out  1315         Minutes  [address], PTA

## 2017-11-11 NOTE — PROGRESS NOTES
Angelica's taking. Review of additional records showed that there was a preprocedure EKG done prior to the recent surgery at [facility] [address]. Angelica's which showed atrial fibrillation, but this was not noted in the admission short H and P on the day of the procedure itself.     Brain 11/9 films reviewed by present physician shows a large left PCA distribution acute infarction and also a small right cerebellar hemisphere acute infarction which is somewhat patchy.      LDL 48  A1c 6.0  Echo still pending     Cardiology consultation 11/10 referring to the atrial flutter recommended \"anticoagulate per neurology. \"  Neuro critical care recommends restarting Eliquis 5 days from the date of the stroke which will be 11/13, and his recommendation was echoed by endovascular service, maintaining patient only on aspirin antiplatelet therapy until that time. No current facility-administered medications on file prior to encounter. Current Outpatient Prescriptions on File Prior to Encounter   Medication Sig Dispense Refill    docusate sodium (COLACE) 100 MG capsule Take 1 capsule by mouth 2 times daily 30 capsule 0    oxyCODONE-acetaminophen (PERCOCET) 5-325 MG per tablet Take 1-2 tablets by mouth every 4 hours as needed for Pain .  80 tablet 0    ondansetron (ZOFRAN) 4 MG tablet Take 1 tablet by mouth every 6 hours as needed for Nausea 30 tablet 1    apixaban (ELIQUIS) 2.5 MG TABS tablet Take 1 tablet by mouth 2 times daily 60 tablet 0    metFORMIN (GLUCOPHAGE) 500 MG tablet Take 500 mg by mouth 2 times daily (with meals)      atorvastatin (LIPITOR) 40 MG tablet Take 40 mg by mouth nightly      gabapentin (NEURONTIN) 300 MG capsule Take 300 mg by mouth 2 times daily      amLODIPine (NORVASC) 5 MG tablet Take 5 mg by mouth daily      lisinopril (PRINIVIL;ZESTRIL) 20 MG tablet Take 20 mg by mouth 2 times daily      Cholecalciferol (VITAMIN D3 PO) Take 1 tablet by mouth 2 times daily      Insulin NPH Isophane & Regular /74   Pulse 113   Temp 98 °F (36.7 °C) (Oral)   Resp (!) 31   Ht 6' 1\" (1.854 m)   Wt 204 lb 2.3 oz (92.6 kg)   SpO2 98%   BMI 26.93 kg/m²     Blood pressure range: Systolic (51SFK), CZZ:209 , Min:122 , CSJ:979   ; Diastolic (80FKO), UAB:96, Min:60, Max:97     ON EXAMINATION:  GENERAL  Appears comfortable and in no distress   HEENT  NC/ AT   NECK  Supple and no bruits heard   MENTAL STATUS:  Alert, partially oriented to place and time. No language parameter disturbance verbally. Some inattentiveness, some difficulty following complex commands, follows simple commands well.      CRANIAL NERVES: II     -       dense RHH  III,IV,VI - PRRRE without nystagmus; EOMs full, no afferent defect, no                      RAYNA, no ptosis  V     -     Normal facial sensation  VII    -     Normal facial symmetry  VIII   -     Intact hearing  IX,X -     Symmetrical palate, could swallow  XI    -     Symmetrical shoulder shrug  XII   -     Midline tongue, no atrophy    MOTOR FUNCTION:  Normal bulk, normal tone, except that strength testing in proximal lower extremities is limited due to left hip pain, otherwise normal power;  no involuntary movements, no tremor   SENSORY FUNCTION:  Normal touch, normal pain grossly    CEREBELLAR FUNCTION:  no upper extremity limb cerebellar signs    REFLEX FUNCTION:  Symmetric, 1/4, plantars flexor    STATION and GAIT  Not tested            Data:    Lab Results:   CBC:   Recent Labs      11/09/17   0602  11/10/17   0627  11/11/17   0828   WBC  10.7  9.1  9.1   HGB  10.3*  9.9*  10.4*   PLT  191  182  256     BMP:    Recent Labs      11/09/17   0602  11/10/17   0627   NA  133*  138   K  3.7  3.5*   CL  100  103   CO2  24  25   BUN  9  8   CREATININE  0.60*  0.59*   GLUCOSE  149*  155*         Lab Results   Component Value Date    CHOL 98 11/09/2017    LDLCHOLESTEROL 48 11/09/2017    HDL 36 (L) 11/09/2017    TRIG 71 11/09/2017    ALT 20 03/07/2016    AST 23 03/07/2016    TSH 0.95

## 2017-11-11 NOTE — PROGRESS NOTES
[facility]  Progress Note    2017       Name:  Morenita Thompson  MRN:    [id number]     Acct:     [de-identified]   Room:  [facility] Day: 3     Admit Date: 2017  8:27 PM  PCP: Shaan Cheney MD    Subjective;  C/C:    Mental status change    Interval History:  Status:  improved  Comfortable  No new complaints  No headache      ROS:  Review of Systems   Constitutional: Negative for chills, fever and malaise/fatigue. HENT: Negative for hearing loss. Eyes: Positive for blurred vision. Negative for double vision and photophobia. Respiratory: Negative for cough, sputum production, shortness of breath and wheezing. Cardiovascular: Negative for chest pain and palpitations. Gastrointestinal: Negative for blood in stool, constipation, nausea and vomiting. Genitourinary: Negative for flank pain and hematuria. Musculoskeletal: Positive for joint pain (hip pain- chronic). Negative for back pain. Skin: Negative for rash. Neurological: Positive for headaches (Occipital). Negative for dizziness, focal weakness, seizures and weakness. Physical:  BP (!) 109/52   Pulse 86   Temp 97.7 °F (36.5 °C) (Oral)   Resp 23   Ht 6' 1\" (1.854 m)   Wt 204 lb 2.3 oz (92.6 kg)   SpO2 99%   BMI 26.93 kg/m²   Temp (24hrs), Av °F (36.7 °C), Min:97.7 °F (36.5 °C), Max:98.2 °F (36.8 °C)    Physical Exam   Constitutional: No distress. HENT:   Head: Normocephalic and atraumatic. Eyes: Conjunctivae are normal.   Neck: Neck supple. No JVD present. Cardiovascular: Normal rate and regular rhythm. No murmur heard. tele appears to be atrial flutter   Pulmonary/Chest: Effort normal and breath sounds normal. No respiratory distress. He has no wheezes. He has no rales. Abdominal: Soft. Bowel sounds are normal. He exhibits no distension. There is no tenderness. Musculoskeletal: He exhibits no edema or tenderness. Neurological: He is alert. He exhibits normal muscle tone.    Awake / speech fluent  Moving extremities x four  Still has visual field deficits to his right   Skin: Skin is warm and dry. He is not diaphoretic. Meds:  Allergies: No Known Allergies    Current Meds:       famotidine (PEPCID) tablet 20 mg BID   aspirin chewable tablet 324 mg Daily   potassium chloride (KLOR-CON M) extended release tablet 40 mEq PRN   Or    potassium chloride 20 MEQ/15ML (10%) oral solution 40 mEq PRN   Or    potassium chloride 10 mEq/100 mL IVPB (Peripheral Line) PRN   metoprolol tartrate (LOPRESSOR) tablet 50 mg BID   digoxin (LANOXIN) tablet 250 mcg Daily   enoxaparin (LOVENOX) injection 40 mg Daily   0.9 % sodium chloride infusion Continuous   acetaminophen (TYLENOL) tablet 650 mg Q4H PRN   docusate sodium (COLACE) capsule 100 mg BID   ondansetron (ZOFRAN) injection 4 mg Q6H PRN   sodium chloride flush 0.9 % injection 10 mL 2 times per day   sodium chloride flush 0.9 % injection 10 mL PRN   dextrose 5 % solution PRN   dextrose 50 % solution 12.5 g PRN   glucagon (rDNA) injection 1 mg PRN   glucose (GLUTOSE) 40 % oral gel 15 g PRN   atorvastatin (LIPITOR) tablet 40 mg Nightly   HYDROcodone-acetaminophen (NORCO) 5-325 MG per tablet 1 tablet Q4H PRN   hydrOXYzine (ATARAX) tablet 25 mg TID PRN   insulin lispro (HUMALOG) injection vial 0-12 Units TID WC   insulin lispro (HUMALOG) injection vial 0-6 Units Nightly   LORazepam (ATIVAN) tablet 0.5 mg Q4H PRN   sodium chloride flush 0.9 % injection 10 mL PRN   tamsulosin (FLOMAX) capsule 0.4 mg Nightly   labetalol (NORMODYNE;TRANDATE) injection 10 mg Once       I/O (24Hr):     Intake/Output Summary (Last 24 hours) at 11/11/17 1353  Last data filed at 11/11/17 1318   Gross per 24 hour   Intake              300 ml   Output              100 ml   Net              200 ml       Labs:  Hematology:  Recent Labs      11/09/17   0602  11/10/17   0627  11/11/17   0828   WBC  10.7  9.1  9.1   HGB  10.3*  9.9*  10.4*   HCT  31.9*  30.3*  32.3*   PLT  191  182  256   INR 1.1   --    --      Chemistry:  Recent Labs      11/09/17   0602  11/10/17   0627  11/11/17   0828   NA  133*  138  140   K  3.7  3.5*  3.7   CL  100  103  103   CO2  24  25  22   GLUCOSE  149*  155*  166*   BUN  9  8  8   CREATININE  0.60*  0.59*  0.60*   MG   --   1.9   --    CALCIUM  8.3*  8.3*  8.7     Recent Labs      11/09/17   0602  11/10/17   0627   LABA1C  6.0   --    TSH   --   0.95   CHOL  98   --    TRIG  71   --    HDL  36*   --      Recent Labs      11/10/17   1632  11/10/17   2112  11/11/17   0829  11/11/17   1214   POCGLU  171*  164*  151*  189*       Assessment:  Primary Problem  Occipital infarction [facility])     Active Hospital Problems    Diagnosis Date Noted    Cerebellar infarct ([facility] - right  [I63.9] 11/11/2017    Hypokalemia [E87.6] 11/10/2017    Paroxysmal atrial fibrillation (HCC) [I48.0]     Occipital infarction ([facility] [I63.9] 11/09/2017    Stenosis of right vertebral artery [I65.01] 11/09/2017    Hypertension [I10]     Controlled type 2 diabetes mellitus without complication, with long-term current use of insulin ([address] [E11.9, Z79.4] 11/08/2017    Dyslipidemia [E78.5] 11/08/2017    Essential hypertension [I10] 11/08/2017     Past Medical History:   Diagnosis Date    Arthritis     Diabetes ([address]     Hearing aid worn     bilateral    [place] (hard of hearing)     Wears bilateral Hearing Aids    Hyperlipidemia     Hypertension     Occipital infarction ([address] 11/9/2017        Plan:  Neurology evaluation  ASA / Eliquis on hold - on hold for 1 week  Cardiology consultation - AMY/cardioversion be considered  EPCs  Will monitor and control Blood Pressure  Will monitor and control Diabetes  Physical therapy and rehabilitation   Social Service consult for discharge planning    IP CONSULT TO NEUROLOGY  IP CONSULT TO SOCIAL WORK  IP CONSULT TO CARDIOLOGY    Electronically signed by Max Newell DO on 11/11/2017 at 1:53 PM

## 2017-11-12 LAB
ABSOLUTE EOS #: 0.08 K/UL (ref 0–0.44)
ABSOLUTE IMMATURE GRANULOCYTE: 0.04 K/UL (ref 0–0.3)
ABSOLUTE LYMPH #: 1 K/UL (ref 1.1–3.7)
ABSOLUTE MONO #: 0.76 K/UL (ref 0.1–1.2)
ANION GAP SERPL CALCULATED.3IONS-SCNC: 13 MMOL/L (ref 9–17)
BASOPHILS # BLD: 0 % (ref 0–2)
BASOPHILS ABSOLUTE: <0.03 K/UL (ref 0–0.2)
BUN BLDV-MCNC: 10 MG/DL (ref 8–23)
BUN/CREAT BLD: ABNORMAL (ref 9–20)
CALCIUM SERPL-MCNC: 8.5 MG/DL (ref 8.6–10.4)
CHLORIDE BLD-SCNC: 100 MMOL/L (ref 98–107)
CO2: 25 MMOL/L (ref 20–31)
CREAT SERPL-MCNC: 0.64 MG/DL (ref 0.7–1.2)
DIFFERENTIAL TYPE: ABNORMAL
EOSINOPHILS RELATIVE PERCENT: 1 % (ref 1–4)
GFR AFRICAN AMERICAN: >60 ML/MIN
GFR NON-AFRICAN AMERICAN: >60 ML/MIN
GFR SERPL CREATININE-BSD FRML MDRD: ABNORMAL ML/MIN/{1.73_M2}
GFR SERPL CREATININE-BSD FRML MDRD: ABNORMAL ML/MIN/{1.73_M2}
GLUCOSE BLD-MCNC: 151 MG/DL (ref 70–99)
GLUCOSE BLD-MCNC: 156 MG/DL (ref 75–110)
GLUCOSE BLD-MCNC: 169 MG/DL (ref 75–110)
GLUCOSE BLD-MCNC: 202 MG/DL (ref 75–110)
GLUCOSE BLD-MCNC: 212 MG/DL (ref 75–110)
HCT VFR BLD CALC: 29.4 % (ref 40.7–50.3)
HEMOGLOBIN: 9.5 G/DL (ref 13–17)
IMMATURE GRANULOCYTES: 1 %
LYMPHOCYTES # BLD: 13 % (ref 24–43)
MCH RBC QN AUTO: 29.2 PG (ref 25.2–33.5)
MCHC RBC AUTO-ENTMCNC: 32.3 G/DL (ref 28.4–34.8)
MCV RBC AUTO: 90.5 FL (ref 82.6–102.9)
MONOCYTES # BLD: 10 % (ref 3–12)
PDW BLD-RTO: 11.8 % (ref 11.8–14.4)
PLATELET # BLD: 283 K/UL (ref 138–453)
PLATELET ESTIMATE: ABNORMAL
PMV BLD AUTO: 9.3 FL (ref 8.1–13.5)
POTASSIUM SERPL-SCNC: 3.4 MMOL/L (ref 3.7–5.3)
RBC # BLD: 3.25 M/UL (ref 4.21–5.77)
RBC # BLD: ABNORMAL 10*6/UL
SEG NEUTROPHILS: 75 % (ref 36–65)
SEGMENTED NEUTROPHILS ABSOLUTE COUNT: 5.71 K/UL (ref 1.5–8.1)
SODIUM BLD-SCNC: 138 MMOL/L (ref 135–144)
WBC # BLD: 7.6 K/UL (ref 3.5–11.3)
WBC # BLD: ABNORMAL 10*3/UL

## 2017-11-12 PROCEDURE — 6360000002 HC RX W HCPCS: Performed by: INTERNAL MEDICINE

## 2017-11-12 PROCEDURE — 6370000000 HC RX 637 (ALT 250 FOR IP): Performed by: EMERGENCY MEDICINE

## 2017-11-12 PROCEDURE — 80048 BASIC METABOLIC PNL TOTAL CA: CPT

## 2017-11-12 PROCEDURE — 2060000000 HC ICU INTERMEDIATE R&B

## 2017-11-12 PROCEDURE — 6370000000 HC RX 637 (ALT 250 FOR IP): Performed by: PSYCHIATRY & NEUROLOGY

## 2017-11-12 PROCEDURE — 94762 N-INVAS EAR/PLS OXIMTRY CONT: CPT

## 2017-11-12 PROCEDURE — 6370000000 HC RX 637 (ALT 250 FOR IP): Performed by: INTERNAL MEDICINE

## 2017-11-12 PROCEDURE — 99232 SBSQ HOSP IP/OBS MODERATE 35: CPT | Performed by: PSYCHIATRY & NEUROLOGY

## 2017-11-12 PROCEDURE — 2580000003 HC RX 258: Performed by: INTERNAL MEDICINE

## 2017-11-12 PROCEDURE — 36415 COLL VENOUS BLD VENIPUNCTURE: CPT

## 2017-11-12 PROCEDURE — 82947 ASSAY GLUCOSE BLOOD QUANT: CPT

## 2017-11-12 PROCEDURE — 85025 COMPLETE CBC W/AUTO DIFF WBC: CPT

## 2017-11-12 PROCEDURE — 99239 HOSP IP/OBS DSCHRG MGMT >30: CPT | Performed by: INTERNAL MEDICINE

## 2017-11-12 PROCEDURE — 97116 GAIT TRAINING THERAPY: CPT

## 2017-11-12 PROCEDURE — 97530 THERAPEUTIC ACTIVITIES: CPT

## 2017-11-12 PROCEDURE — 97110 THERAPEUTIC EXERCISES: CPT

## 2017-11-12 RX ORDER — POTASSIUM CHLORIDE 7.45 MG/ML
10 INJECTION INTRAVENOUS PRN
Status: DISCONTINUED | OUTPATIENT
Start: 2017-11-12 | End: 2017-11-13 | Stop reason: HOSPADM

## 2017-11-12 RX ORDER — POTASSIUM CHLORIDE 20MEQ/15ML
40 LIQUID (ML) ORAL PRN
Status: DISCONTINUED | OUTPATIENT
Start: 2017-11-12 | End: 2017-11-13 | Stop reason: HOSPADM

## 2017-11-12 RX ORDER — POTASSIUM CHLORIDE 20 MEQ/1
40 TABLET, EXTENDED RELEASE ORAL PRN
Status: DISCONTINUED | OUTPATIENT
Start: 2017-11-12 | End: 2017-11-13 | Stop reason: HOSPADM

## 2017-11-12 RX ADMIN — METOPROLOL TARTRATE 50 MG: 50 TABLET, FILM COATED ORAL at 20:54

## 2017-11-12 RX ADMIN — FAMOTIDINE 20 MG: 20 TABLET, FILM COATED ORAL at 07:42

## 2017-11-12 RX ADMIN — INSULIN LISPRO 2 UNITS: 100 INJECTION, SOLUTION INTRAVENOUS; SUBCUTANEOUS at 20:57

## 2017-11-12 RX ADMIN — DOCUSATE SODIUM 100 MG: 100 CAPSULE ORAL at 07:42

## 2017-11-12 RX ADMIN — ENOXAPARIN SODIUM 40 MG: 40 INJECTION SUBCUTANEOUS at 07:41

## 2017-11-12 RX ADMIN — ASPIRIN 324 MG: 81 TABLET, CHEWABLE ORAL at 07:43

## 2017-11-12 RX ADMIN — INSULIN LISPRO 2 UNITS: 100 INJECTION, SOLUTION INTRAVENOUS; SUBCUTANEOUS at 08:15

## 2017-11-12 RX ADMIN — METOPROLOL TARTRATE 50 MG: 50 TABLET, FILM COATED ORAL at 07:43

## 2017-11-12 RX ADMIN — Medication 10 ML: at 07:46

## 2017-11-12 RX ADMIN — INSULIN LISPRO 2 UNITS: 100 INJECTION, SOLUTION INTRAVENOUS; SUBCUTANEOUS at 12:47

## 2017-11-12 RX ADMIN — DIGOXIN 250 MCG: 0.25 TABLET ORAL at 07:42

## 2017-11-12 RX ADMIN — INSULIN LISPRO 4 UNITS: 100 INJECTION, SOLUTION INTRAVENOUS; SUBCUTANEOUS at 17:48

## 2017-11-12 RX ADMIN — ATORVASTATIN CALCIUM 40 MG: 40 TABLET, FILM COATED ORAL at 20:54

## 2017-11-12 RX ADMIN — TAMSULOSIN HYDROCHLORIDE 0.4 MG: 0.4 CAPSULE ORAL at 20:55

## 2017-11-12 RX ADMIN — DOCUSATE SODIUM 100 MG: 100 CAPSULE ORAL at 20:54

## 2017-11-12 RX ADMIN — FAMOTIDINE 20 MG: 20 TABLET, FILM COATED ORAL at 20:54

## 2017-11-12 RX ADMIN — Medication 10 ML: at 20:55

## 2017-11-12 ASSESSMENT — PAIN SCALES - GENERAL: PAINLEVEL_OUTOF10: 0

## 2017-11-12 ASSESSMENT — ENCOUNTER SYMPTOMS
NAUSEA: 0
BLOOD IN STOOL: 0
HEMOPTYSIS: 0
VOMITING: 0
COUGH: 0

## 2017-11-12 NOTE — PROGRESS NOTES
[facility]   Progress Note                   Date:   11/12/2017  Patient name: Dontae Yeh  Date of admission:  11/8/2017  8:27 PM  MRN:   [id number]  YOB: 1945  PCP: Jan Fritz MD    Reason for Admission:  CVA     Subjective:   HR in 80-90`s in last 24 hrs  Had an episode of atrial flutter this am  NO complains of, shortness of breath, palpitations  Mentation improved, alert oriented    Medications:   Scheduled Meds:   famotidine  20 mg Oral BID    aspirin  324 mg Oral Daily    metoprolol tartrate  50 mg Oral BID    digoxin  250 mcg Oral Daily    enoxaparin  40 mg Subcutaneous Daily    docusate sodium  100 mg Oral BID    sodium chloride flush  10 mL Intravenous 2 times per day    atorvastatin  40 mg Oral Nightly    insulin lispro  0-12 Units Subcutaneous TID WC    insulin lispro  0-6 Units Subcutaneous Nightly    tamsulosin  0.4 mg Oral Nightly    labetalol  10 mg Intravenous Once     Continuous Infusions:   sodium chloride 75 mL/hr at 11/09/17 1620    dextrose       CBC:   Recent Labs      11/10/17   0627  11/11/17   0828  11/12/17   0722   WBC  9.1  9.1  7.6   HGB  9.9*  10.4*  9.5*   PLT  182  256  283     BMP:    Recent Labs      11/10/17   0627  11/11/17   0828  11/12/17   0722   NA  138  140  138   K  3.5*  3.7  3.4*   CL  103  103  100   CO2  25  22  25   BUN  8  8  10   CREATININE  0.59*  0.60*  0.64*   GLUCOSE  155*  166*  151*     Hepatic: No results for input(s): AST, ALT, ALB, BILITOT, ALKPHOS in the last 72 hours. Troponin: No results for input(s): TROPONINI in the last 72 hours. BNP: No results for input(s): BNP in the last 72 hours. Lipids:   No results for input(s): CHOL, HDL in the last 72 hours. Invalid input(s): LDLCALCU  INR:   No results for input(s): INR in the last 72 hours.     Objective:   Vitals: /60   Pulse 88   Temp 98.7 °F (37.1 °C) (Oral)   Resp 24   Ht 6' 1\" (1.854 m)   Wt 204 lb 2.3 oz (92.6 kg)   SpO2 98% without complication, with long-term current use of insulin (HCC)     Essential hypertension     Dyslipidemia     Occipital infarction ([address]     Stenosis of right vertebral artery     Hypertension     Paroxysmal atrial fibrillation (HCC)     Hypokalemia      Plan of Treatment:   AF , NOW IN NSR ,   CONTINUE BB, DIG - rate controlled  2 D ECHO reviewed  K 3.4 this am, replacement per sliding scale  Eliquis restarted  HTN WELL CONTROLLED   Ok to d/c from cardiology    Neo Clifton MD      Department of Internal Medicine  [facility]         11/12/2017, 8:19 AM      Attending Physician Statement  I have discussed the care of the patient, including pertinent history and exam findings, with the resident. I have seen and examined the patient and the key elements of all parts of the encounter have been performed by me. I agree with the assessment, plan and orders as documented by the resident. Follow up in  2-3 wks   DR. Dominga Lovett

## 2017-11-12 NOTE — PROGRESS NOTES
Physical Therapy  Facility/Department: [facility] NEURO  Daily Treatment Note  NAME: Claudeen Plunk  : 1945  MRN: [id number]    Date of Service: 2017    Patient Diagnosis(es):   Patient Active Problem List    Diagnosis Date Noted    Hyperglycemia 2017    Cerebellar infarct ([address] - right  2017    Hypokalemia 11/10/2017    Paroxysmal atrial fibrillation ([address]     Occipital infarction ([address] 2017    Stenosis of right vertebral artery 2017    Hypertension     Acute metabolic encephalopathy     Arthralgia of left hip 2017    Controlled type 2 diabetes mellitus without complication, with long-term current use of insulin ([address] 2017    Essential hypertension 2017    Dyslipidemia 2017       Past Medical History:   Diagnosis Date    Arthritis     Diabetes ([address]     Hearing aid worn     bilateral    [place] (hard of hearing)     Wears bilateral Hearing Aids    Hyperlipidemia     Hypertension     Occipital infarction ([address] 2017     Past Surgical History:   Procedure Laterality Date    BACK SURGERY      lumbar spine    CATARACT REMOVAL Right 2016    CHOLECYSTECTOMY      NECK SURGERY      TOTAL HIP ARTHROPLASTY Left 2017    LEFT HIP TOTAL ARTHROPLASTY ANTERIOR APPROACH     TOTAL HIP ARTHROPLASTY Left 2017    LEFT HIP TOTAL ARTHROPLASTY ANTERIOR APPROACH - BIOMET (TANMAY) MARIA LUISA performed by Albert Yoder MD at [facility]  Restrictions/Precautions  Restrictions/Precautions: Weight Bearing, General Precautions  Required Braces or Orthoses?: No  Lower Extremity Weight Bearing Restrictions  Left Lower Extremity Weight Bearing: Weight Bearing As Tolerated  Position Activity Restriction  Other position/activity restrictions: Anterior approach CHRISTIANO - no straight leg raises  Subjective   General  Chart Reviewed: Yes  Response To Previous Treatment: Patient with no complaints from previous session.   Family / Caregiver Present: Yes (Wife)  Subjective  Subjective: RN and pt agreed to tx this PM. Pt alert and in bed upon arrival. No noted confusion this treatment as pt was able to follow all commands and converse during tx. Pain Screening  Patient Currently in Pain: No  Vital Signs  Patient Currently in Pain: No       Orientation  Orientation  Overall Orientation Status: Within Functional Limits  Orientation Level: Oriented X4  Objective   Bed mobility  Supine to Sit: Minimal assistance (Tactile assistance for leg positioning)  Sit to Supine: Minimal assistance (Tactile assistance for leg positioning)  Scooting: Contact guard assistance  Transfers  Sit to Stand: Stand by assistance  Stand to sit: Stand by assistance  Comment: Pt stood bedside with RW SBA, to allow for gown changing. Ambulation  Ambulation?: Yes  Ambulation 1  Surface: level tile  Device: Rolling Walker  Assistance: Stand by assistance  Quality of Gait: Minimal Trendelenburg gait when amb. Normal pattie and step length, step-through pattern. Improved self-correction with obstacles on R side. Distance: 300ft  Stairs/Curb  Stairs?: No     Balance  Posture: Fair  Sitting - Static: Good;-  Sitting - Dynamic: Fair;+  Standing - Static: Fair, pt stood 5' at RW with SBA to doff soiled gown, no LOB noted  Standing - Dynamic: Fair     Exercises  Seated LE exercise program: Long Arc Quads, hip abduction/adduction, heel/toe raises, and marches. Reps: 1x15, Marches RLE only        Assessment   Body structures, Functions, Activity limitations: Decreased functional mobility ; Decreased ADL status; Decreased ROM; Decreased strength;Decreased safe awareness;Decreased cognition;Decreased endurance;Decreased balance;Decreased fine motor control  Assessment: Pt amb 300ft RW SBA. Pt demonstrated improved performance this tx during ambulation and seated exercises. No confusion noted this tx. Pt will benefit from IP rehab for improved strength and endurance.   Prognosis: Good  Patient Education: Remaining in chair until RN can assist with transfer. REQUIRES PT FOLLOW UP: Yes  Activity Tolerance  Activity Tolerance: Patient Tolerated treatment well       Discharge Recommendations:  IP Rehab   In my professional opinion, this patient could tolerate a total of 3 hours of combined therapies at an acute rehab facility. Goals  Short term goals  Time Frame for Short term goals: 14 visits  Short term goal 1: Patient will complete all bed mobility tasks w/ SBA  Short term goal 2: Patient will complete functional transfers w/ SBA  Short term goal 3: Patient will ambulate 50ft w/ least restrictive device w/ Evelina  Short term goal 4: Patient will tolerate 20+ min of therapeutic exercise/activity w/ therapy BID    Plan    Plan  Times per week: BID (CHRISTIANO)  Times per day: Twice a day  Current Treatment Recommendations: Strengthening, ROM, Balance Training, Functional Mobility Training, Transfer Training, Cognitive/Perceptual Training, Endurance Training, Gait Training, Stair training, Neuromuscular Re-education, Cognitive Reorientation, Pain Management, Home Exercise Program, Safety Education & Training, Patient/Caregiver Education & Training, Equipment Evaluation, Education, & procurement, Positioning  Safety Devices  Type of devices: Gait belt, Patient at risk for falls, Nurse notified, Call light within reach, All fall risk precautions in place, Left in bed  Restraints  Initially in place: No     Therapy Time   Individual Concurrent Group Co-treatment   Time In 7729         Time Out 1607         Minutes [address] [facility]. performed by Student PTA under the supervision of co-signing PTA who agrees with all treatment and documentation.    Virgilio Ken PTA

## 2017-11-12 NOTE — CARE COORDINATION
Transitional Planning  Discharge order noted. Per notes, unsure where patient is accepted for admission. No pre-cert necessary. Called [facility] - spoke to Sylvester Reid - [facility] no one in Admissions available today, but will contact on- to check on acceptance. States per DON, will contact Admissions to check on progress and return call. Received call from [facility], states they are able to admit patient today. HENS done. Patient going to [facility]. Informed per RN that patient had telesitter up until approx 1 hour ago. Called [facility], spoke to Suhas Silvestre,  patient needs to be sitter free x 24 hours. Discussed with patient and wife.

## 2017-11-12 NOTE — PLAN OF CARE
[address] [place] [address]       Second Visit Note  For more detailed information please refer to the progress note of the day      11/12/2017    2:17 PM    Name:   Mark Munoz  MRN:     [id number]     Acct:      [de-identified]   Room:   [facility] Day:  4  Admit Date:  11/8/2017  8:27 PM    PCP:   Moira Boeck, MD  Code Status:  Full Code    Patient was seen  Comfortable  No distress  Discharge planning in progress  Could not be discharged because he was on tele-sitter last night    CURRENT MEDS:     potassium chloride (KLOR-CON M) extended release tablet 40 mEq PRN   Or    potassium chloride 20 MEQ/15ML (10%) oral solution 40 mEq PRN   Or    potassium chloride 10 mEq/100 mL IVPB (Peripheral Line) PRN   famotidine (PEPCID) tablet 20 mg BID   aspirin chewable tablet 324 mg Daily   potassium chloride (KLOR-CON M) extended release tablet 40 mEq PRN   Or    potassium chloride 20 MEQ/15ML (10%) oral solution 40 mEq PRN   Or    potassium chloride 10 mEq/100 mL IVPB (Peripheral Line) PRN   metoprolol tartrate (LOPRESSOR) tablet 50 mg BID   digoxin (LANOXIN) tablet 250 mcg Daily   enoxaparin (LOVENOX) injection 40 mg Daily   0.9 % sodium chloride infusion Continuous   acetaminophen (TYLENOL) tablet 650 mg Q4H PRN   docusate sodium (COLACE) capsule 100 mg BID   ondansetron (ZOFRAN) injection 4 mg Q6H PRN   sodium chloride flush 0.9 % injection 10 mL 2 times per day   sodium chloride flush 0.9 % injection 10 mL PRN   dextrose 5 % solution PRN   dextrose 50 % solution 12.5 g PRN   glucagon (rDNA) injection 1 mg PRN   glucose (GLUTOSE) 40 % oral gel 15 g PRN   atorvastatin (LIPITOR) tablet 40 mg Nightly   HYDROcodone-acetaminophen (NORCO) 5-325 MG per tablet 1 tablet Q4H PRN   hydrOXYzine (ATARAX) tablet 25 mg TID PRN   insulin lispro (HUMALOG) injection vial 0-12 Units TID WC   insulin lispro (HUMALOG) injection vial 0-6 Units Nightly   LORazepam (ATIVAN) tablet 0.5 mg Q4H PRN   sodium chloride flush 0.9 % injection 10 mL PRN   tamsulosin (FLOMAX) capsule 0.4 mg Nightly   labetalol (NORMODYNE;TRANDATE) injection 10 mg Once       VITALS:  BP (!) 113/53   Pulse 90   Temp 99.2 °F (37.3 °C) (Oral)   Resp 20   Ht 6' 1\" (1.854 m)   Wt 204 lb 2.3 oz (92.6 kg)   SpO2 97%   BMI 26.93 kg/m²     LABS:   Hematology:  Recent Labs      11/10/17   0627  11/11/17   0828  11/12/17   0722   WBC  9.1  9.1  7.6   HGB  9.9*  10.4*  9.5*   HCT  30.3*  32.3*  29.4*   PLT  182  256  283     Chemistry:  Recent Labs      11/10/17   0627  11/11/17   0828  11/12/17   0722   NA  138  140  138   K  3.5*  3.7  3.4*   CL  103  103  100   CO2  25  22  25   GLUCOSE  155*  166*  151*   BUN  8  8  10   CREATININE  0.59*  0.60*  0.64*   MG  1.9   --    --    CALCIUM  8.3*  8.7  8.5*     Recent Labs      11/10/17   0627   TSH  0.95       PROBLEMS:  Principal Problem:    Occipital infarction [facility])  Active Problems:    Controlled type 2 diabetes mellitus without complication, with long-term current use of insulin (HCC)    Essential hypertension    Dyslipidemia    Stenosis of right vertebral artery    Hypertension    Paroxysmal atrial fibrillation (HCC)    Hypokalemia    Cerebellar infarct (HCC) - right     Hyperglycemia      UPDATED PLAN:  See current orders  Telesitter DC'd  Discharge planning in progress  Will discharge when arrangements complete and ok with other services.   Potassium supplementation  Monitor glucose  Neurology evaluation  ASA  ( Eliquis on hold - on hold for 1 week)  Cardiology consultation - AMY/cardioversion planned on an outpatient basis  Anticoagulation per cardiology  EPCs  Will monitor and control Blood Pressure  Will monitor and control Diabetes  Physical therapy and rehabilitation   Discharge planning - 45 min+  Family hopes to transfer patient to the [facility] [place] [address] rec done  RISHI done  Follow-up with Dr. Ileana Solitario  Follow-up with Dr. Faisal Nieto with Dr. Anastasia Cabello  The patient was instructed to follow up with their PCP,

## 2017-11-12 NOTE — PROGRESS NOTES
Angelica's taking. Review of additional records showed that there was a preprocedure EKG done prior to the recent surgery at [facility]. Angelica's which showed atrial fibrillation, but this was not noted in the admission short H and P on the day of the procedure itself.     Brain 11/9 films reviewed by present physician shows a large left PCA distribution acute infarction and also a small right cerebellar hemisphere acute infarction which is somewhat patchy.      LDL 48  A1c 6.0  Echo without evidence for cardioembolic source.   Cardiology consultation 11/10 referring to the atrial flutter recommended \"anticoagulate per neurology. \"  Neuro critical care recommends restarting Eliquis 5 days from the date of the stroke which will be 11/13, and his recommendation was echoed by endovascular service, maintaining patient only on aspirin antiplatelet therapy until that time. Note that patient had an episode of atrial flutter morning 11/12. Cardiology service is following. No current facility-administered medications on file prior to encounter.       Current Outpatient Prescriptions on File Prior to Encounter   Medication Sig Dispense Refill    docusate sodium (COLACE) 100 MG capsule Take 1 capsule by mouth 2 times daily 30 capsule 0    ondansetron (ZOFRAN) 4 MG tablet Take 1 tablet by mouth every 6 hours as needed for Nausea 30 tablet 1    metFORMIN (GLUCOPHAGE) 500 MG tablet Take 500 mg by mouth 2 times daily (with meals)      atorvastatin (LIPITOR) 40 MG tablet Take 40 mg by mouth nightly      gabapentin (NEURONTIN) 300 MG capsule Take 300 mg by mouth 2 times daily      amLODIPine (NORVASC) 5 MG tablet Take 5 mg by mouth daily      lisinopril (PRINIVIL;ZESTRIL) 20 MG tablet Take 20 mg by mouth 2 times daily      Cholecalciferol (VITAMIN D3 PO) Take 1 tablet by mouth 2 times daily      Insulin NPH Isophane & Regular (HUMULIN 70/30 PEN) (70-30) 100 UNIT per ML injection pen Inject 30 Units into the skin every morning      Insulin NPH Isophane & Regular (HUMULIN 70/30 PEN) (70-30) 100 UNIT per ML injection pen Inject 22 Units into the skin nightly      tamsulosin (FLOMAX) 0.4 MG capsule Take 0.4 mg by mouth nightly      metoprolol succinate (TOPROL XL) 50 MG extended release tablet Take 50 mg by mouth daily         Allergies: Gloria Toussaint has No Known Allergies.     Past Medical History:   Diagnosis Date    Arthritis     Diabetes ([address]     Hearing aid worn     bilateral    [place] (hard of hearing)     Wears bilateral Hearing Aids    Hyperlipidemia     Hypertension     Occipital infarction ([address] 11/9/2017       Past Surgical History:   Procedure Laterality Date    BACK SURGERY      lumbar spine    CATARACT REMOVAL Right 11/2016    CHOLECYSTECTOMY      NECK SURGERY      TOTAL HIP ARTHROPLASTY Left 11/07/2017    LEFT HIP TOTAL ARTHROPLASTY ANTERIOR APPROACH     TOTAL HIP ARTHROPLASTY Left 11/7/2017    LEFT HIP TOTAL ARTHROPLASTY ANTERIOR APPROACH - BIOMET (TANMAY) MARIA LUISA performed by Christina Nguyen MD at [facility]           Medications:     famotidine  20 mg Oral BID    aspirin  324 mg Oral Daily    metoprolol tartrate  50 mg Oral BID    digoxin  250 mcg Oral Daily    enoxaparin  40 mg Subcutaneous Daily    docusate sodium  100 mg Oral BID    sodium chloride flush  10 mL Intravenous 2 times per day    atorvastatin  40 mg Oral Nightly    insulin lispro  0-12 Units Subcutaneous TID WC    insulin lispro  0-6 Units Subcutaneous Nightly    tamsulosin  0.4 mg Oral Nightly    labetalol  10 mg Intravenous Once     PRN Meds include: potassium chloride **OR** potassium chloride **OR** potassium chloride, potassium chloride **OR** potassium chloride **OR** potassium chloride, acetaminophen, ondansetron, sodium chloride flush, dextrose, dextrose, glucagon (rDNA), glucose, HYDROcodone 5 mg - acetaminophen, hydrOXYzine, LORazepam, sodium chloride flush    Objective:   /60   Pulse 88   Temp 98.7 °F (37.1 °C) supported by cardiology consultation; until then he is on aspirin 3-24 mg.

## 2017-11-12 NOTE — PROGRESS NOTES
[facility]    Progress Note    11/12/2017    9:13 AM    Name:   Lizeth Guerrero  MRN:     [id number]     Acct:      [de-identified]   Room:   [address] [place] [address] Day:  4  Admit Date:  11/8/2017  8:27 PM    PCP:   Cristiana Cole MD  Code Status:  Full Code    Subjective:     C/C: Stroke  Interval History Status:  improved    Cognition continues to improve  Less confused  No headache  Denies visual change    Brief History:     The patient is a 67 y.o. male who presents with an occipital / Cerebellar stroke  The patient underwent total hip arthroplasty at [address] [place] [address] on 11/7  The family reports that on postop day 1 and became confused  This progressed into grabbing and reaching at things that were not there  He was having difficulty speaking  Workup revealed an occipital stroke  He was transferred to [facility] for neurology/endovascular evaluation  Imaging demonstrates vertebral stenosis  The family has reported some underlying cognitive impairment with memory  There has been concern regarding A. Fib/flutter and embolic phenomenon  His Eliquis is on hold because of the recent infarct and concerns of hemorrhagic conversion    Past Medical History:   has a past medical history of Arthritis; Diabetes ([address] Hearing aid worn; [place] (hard of hearing); Hyperlipidemia; Hypertension; and Occipital infarction ([address] Social History:   reports that he has never smoked. He has never used smokeless tobacco. He reports that he drinks alcohol. He reports that he does not use drugs. Family History:   Family History   Problem Relation Age of Onset    Stroke Mother     Heart Attack Mother     Stroke Father        Medications:      Allergies:  No Known Allergies    Current Meds:   Scheduled Meds:    famotidine  20 mg Oral BID    aspirin  324 mg Oral Daily    metoprolol tartrate  50 mg Oral BID    digoxin  250 mcg Oral Daily    enoxaparin  40 mg Subcutaneous Daily    docusate sodium  100 mg Oral BID    sodium chloride flush  10 mL Intravenous 2 times per day    atorvastatin  40 mg Oral Nightly    insulin lispro  0-12 Units Subcutaneous TID WC    insulin lispro  0-6 Units Subcutaneous Nightly    tamsulosin  0.4 mg Oral Nightly    labetalol  10 mg Intravenous Once     Continuous Infusions:    sodium chloride 75 mL/hr at 17 1620    dextrose       PRN Meds: potassium chloride **OR** potassium chloride **OR** potassium chloride, potassium chloride **OR** potassium chloride **OR** potassium chloride, acetaminophen, ondansetron, sodium chloride flush, dextrose, dextrose, glucagon (rDNA), glucose, HYDROcodone 5 mg - acetaminophen, hydrOXYzine, LORazepam, sodium chloride flush      Review of Systems:     Review of Systems   Constitutional: Negative for chills and fever. Respiratory: Negative for cough and hemoptysis. Cardiovascular: Negative for chest pain and palpitations. Gastrointestinal: Negative for blood in stool, melena, nausea and vomiting. Genitourinary: Negative for flank pain and hematuria. Musculoskeletal: Positive for joint pain and myalgias. His incisional pain is controlled   Neurological: Negative for dizziness, sensory change, speech change, weakness and headaches. Psychiatric/Behavioral: Positive for memory loss. The patient is nervous/anxious. Physical Examination:        Vitals:  /60   Pulse 88   Temp 98.7 °F (37.1 °C) (Oral)   Resp 24   Ht 6' 1\" (1.854 m)   Wt 204 lb 2.3 oz (92.6 kg)   SpO2 98%   BMI 26.93 kg/m²   Temp (24hrs), Av.1 °F (36.7 °C), Min:97.6 °F (36.4 °C), Max:98.7 °F (37.1 °C)    Recent Labs      17   1214  17   1741  17   2120  17   0759   POCGLU  189*  165*  119*  156*       Physical Exam   Constitutional: No distress. HENT:   Head: Normocephalic and atraumatic. Eyes: Conjunctivae are normal. No scleral icterus.    He has had some visual field acute abnormality  Atherosclerotic change is noted in the distal vertebral artery with mild  multifocal narrowing associated. Mild multifocal narrowing is also suggested  in the basilar artery. There is multifocal atherosclerotic change and narrowing suggested in the  cavernous carotid segments bilaterally as well    MRI  Impression:        Multifocal recent infarcts, largest in the left occipital pole. See above  for details. Curvilinear dark gradient echo signal in the left occipital region raising  the question of a small amount of hemorrhage. Echo  Summary  Left ventricle is normal in size with hyperdynamic systolic function. Increased left ventricular wall thickness. Estimated ejection fraction is 60%. Normal right ventricular size and function. Mild mitral regurgitation. Mild tricuspid regurgitation. No pericardial effusion is seen. Assessment:        Primary Problem  Principal Problem:    Occipital infarction [facility])  Active Problems:    Controlled type 2 diabetes mellitus without complication, with long-term current use of insulin (HCC)    Essential hypertension    Dyslipidemia    Stenosis of right vertebral artery    Hypertension    Paroxysmal atrial fibrillation (HCC)    Hypokalemia    Cerebellar infarct (HCC) - right       Plan:        Potassium supplementation  Monitor glucose  Neurology evaluation  ASA  ( Eliquis on hold - on hold for 1 week)  Cardiology consultation - AMY/cardioversion planned on an outpatient basis  Anticoagulation per cardiology  EPCs  Will monitor and control Blood Pressure  Will monitor and control Diabetes  Physical therapy and rehabilitation   Social Service consult for discharge planning  Discharge planning - 45 min+  Family hopes to transfer patient [facility] the [facility]  Will discharge when arrangements complete and ok with other services.   Follow-up with PCP in one week, Teresa Larry MD  Notify PCP of discharge  Med rec done  RISHI done  Follow-up with  Ramiro Case  Follow-up with Dr. Taylor Tenriism with Dr. Stefani Buckley DO  11/12/2017  9:13 AM

## 2017-11-13 VITALS
HEIGHT: 73 IN | TEMPERATURE: 97.6 F | DIASTOLIC BLOOD PRESSURE: 59 MMHG | SYSTOLIC BLOOD PRESSURE: 109 MMHG | HEART RATE: 93 BPM | BODY MASS INDEX: 27.06 KG/M2 | OXYGEN SATURATION: 97 % | WEIGHT: 204.15 LBS | RESPIRATION RATE: 25 BRPM

## 2017-11-13 LAB
ABSOLUTE EOS #: 0.13 K/UL (ref 0–0.44)
ABSOLUTE IMMATURE GRANULOCYTE: 0.04 K/UL (ref 0–0.3)
ABSOLUTE LYMPH #: 1.22 K/UL (ref 1.1–3.7)
ABSOLUTE MONO #: 0.59 K/UL (ref 0.1–1.2)
ANION GAP SERPL CALCULATED.3IONS-SCNC: 12 MMOL/L (ref 9–17)
BASOPHILS # BLD: 0 % (ref 0–2)
BASOPHILS ABSOLUTE: 0.03 K/UL (ref 0–0.2)
BUN BLDV-MCNC: 7 MG/DL (ref 8–23)
BUN/CREAT BLD: ABNORMAL (ref 9–20)
CALCIUM SERPL-MCNC: 8.5 MG/DL (ref 8.6–10.4)
CHLORIDE BLD-SCNC: 100 MMOL/L (ref 98–107)
CO2: 26 MMOL/L (ref 20–31)
CREAT SERPL-MCNC: 0.53 MG/DL (ref 0.7–1.2)
DIFFERENTIAL TYPE: ABNORMAL
EOSINOPHILS RELATIVE PERCENT: 2 % (ref 1–4)
GFR AFRICAN AMERICAN: >60 ML/MIN
GFR NON-AFRICAN AMERICAN: >60 ML/MIN
GFR SERPL CREATININE-BSD FRML MDRD: ABNORMAL ML/MIN/{1.73_M2}
GFR SERPL CREATININE-BSD FRML MDRD: ABNORMAL ML/MIN/{1.73_M2}
GLUCOSE BLD-MCNC: 194 MG/DL (ref 75–110)
GLUCOSE BLD-MCNC: 225 MG/DL (ref 70–99)
GLUCOSE BLD-MCNC: 229 MG/DL (ref 75–110)
HCT VFR BLD CALC: 32.4 % (ref 40.7–50.3)
HEMOGLOBIN: 10.4 G/DL (ref 13–17)
IMMATURE GRANULOCYTES: 1 %
LYMPHOCYTES # BLD: 18 % (ref 24–43)
MCH RBC QN AUTO: 29.5 PG (ref 25.2–33.5)
MCHC RBC AUTO-ENTMCNC: 32.1 G/DL (ref 28.4–34.8)
MCV RBC AUTO: 91.8 FL (ref 82.6–102.9)
MONOCYTES # BLD: 9 % (ref 3–12)
PDW BLD-RTO: 11.7 % (ref 11.8–14.4)
PLATELET # BLD: 363 K/UL (ref 138–453)
PLATELET ESTIMATE: ABNORMAL
PMV BLD AUTO: 9.4 FL (ref 8.1–13.5)
POTASSIUM SERPL-SCNC: 3.7 MMOL/L (ref 3.7–5.3)
RBC # BLD: 3.53 M/UL (ref 4.21–5.77)
RBC # BLD: ABNORMAL 10*6/UL
SEG NEUTROPHILS: 71 % (ref 36–65)
SEGMENTED NEUTROPHILS ABSOLUTE COUNT: 4.86 K/UL (ref 1.5–8.1)
SODIUM BLD-SCNC: 138 MMOL/L (ref 135–144)
WBC # BLD: 6.9 K/UL (ref 3.5–11.3)
WBC # BLD: ABNORMAL 10*3/UL

## 2017-11-13 PROCEDURE — 6370000000 HC RX 637 (ALT 250 FOR IP): Performed by: INTERNAL MEDICINE

## 2017-11-13 PROCEDURE — 97116 GAIT TRAINING THERAPY: CPT

## 2017-11-13 PROCEDURE — 99233 SBSQ HOSP IP/OBS HIGH 50: CPT | Performed by: PSYCHIATRY & NEUROLOGY

## 2017-11-13 PROCEDURE — 36415 COLL VENOUS BLD VENIPUNCTURE: CPT

## 2017-11-13 PROCEDURE — 82947 ASSAY GLUCOSE BLOOD QUANT: CPT

## 2017-11-13 PROCEDURE — 99232 SBSQ HOSP IP/OBS MODERATE 35: CPT | Performed by: PSYCHIATRY & NEUROLOGY

## 2017-11-13 PROCEDURE — 85025 COMPLETE CBC W/AUTO DIFF WBC: CPT

## 2017-11-13 PROCEDURE — 80048 BASIC METABOLIC PNL TOTAL CA: CPT

## 2017-11-13 PROCEDURE — 6370000000 HC RX 637 (ALT 250 FOR IP): Performed by: PSYCHIATRY & NEUROLOGY

## 2017-11-13 PROCEDURE — 6360000002 HC RX W HCPCS: Performed by: INTERNAL MEDICINE

## 2017-11-13 PROCEDURE — 6370000000 HC RX 637 (ALT 250 FOR IP): Performed by: EMERGENCY MEDICINE

## 2017-11-13 PROCEDURE — 97535 SELF CARE MNGMENT TRAINING: CPT

## 2017-11-13 PROCEDURE — 2580000003 HC RX 258: Performed by: INTERNAL MEDICINE

## 2017-11-13 RX ADMIN — METOPROLOL TARTRATE 50 MG: 50 TABLET, FILM COATED ORAL at 08:17

## 2017-11-13 RX ADMIN — INSULIN LISPRO 4 UNITS: 100 INJECTION, SOLUTION INTRAVENOUS; SUBCUTANEOUS at 08:19

## 2017-11-13 RX ADMIN — ENOXAPARIN SODIUM 40 MG: 40 INJECTION SUBCUTANEOUS at 08:18

## 2017-11-13 RX ADMIN — ASPIRIN 324 MG: 81 TABLET, CHEWABLE ORAL at 08:18

## 2017-11-13 RX ADMIN — Medication 10 ML: at 08:20

## 2017-11-13 RX ADMIN — DOCUSATE SODIUM 100 MG: 100 CAPSULE ORAL at 08:18

## 2017-11-13 RX ADMIN — FAMOTIDINE 20 MG: 20 TABLET, FILM COATED ORAL at 08:17

## 2017-11-13 RX ADMIN — DIGOXIN 250 MCG: 0.25 TABLET ORAL at 08:18

## 2017-11-13 ASSESSMENT — ENCOUNTER SYMPTOMS
VOMITING: 0
BLOOD IN STOOL: 0
COUGH: 0
HEMOPTYSIS: 0
NAUSEA: 0

## 2017-11-13 ASSESSMENT — PAIN SCALES - GENERAL: PAINLEVEL_OUTOF10: 0

## 2017-11-13 NOTE — PROGRESS NOTES
NEUROLOGY INPATIENT PROGRESS NOTE    11/13/2017         Subjective: Kae Salazar is a  67 y.o. male admitted on 11/8/2017 with Stroke determined by clinical assessment [facility]) [I63.9]      Briefly, this is a  67 y.o. male admitted on 11/8/2017 with history of HTN, DM, HLD admitted on 11/8/2017 with L occipital infarct. Patient had total L hip replacement at [address] on 11/7. Per charting, the nurse reported that he had some episodes of confusion the morning of 11/8, but was still oriented x3. Due to persistent confusion throughout the day, Internal Medicine was consulted. Due to the fact that he had worsening confusion, as well as hallucinations, a CT head was performed which showed moderate size  acute/subacute L occipital lobe infarct, possible tiny petechial hemorrhage vs. Calcification, otherwise unremarkable study. Dr. Ash Bob of neuroendovascular was consulted and ordered CTA head and neck. CTA's show severe stenosis distal V4 segments in the vertebral arteries bilaterally due to mixed plaque. Dr. Ash Bob informed of results and patient was transferred to [facility] with plan for diagnostic angio and MRI Brain; these studies have not yet been done. There is no apparent history for a cardioembolic source. He came to this hospital as a transfer Eliquis 2.5 mg twice a day; an office H and P from October 26, 2017 makes no mention of anticoagulation medication nor any reason to be on that medication. It appears that this has been started by the orthopedic service at [address] [place] [address] as DVT prophylaxis on account of the THR. Meanwhile, the patient's wife states that has been getting much more clearheaded.     Internal medicine (Dr. Terrence Genao) on a follow-up note at 11/9 noted that telemetry looked like atrial flutter.   However due to anticipated cerebral angiography 11/10, anticoagulation was not started; patient is on Lovenox 40 mg subcu daily for DVT prophylaxis, he is not on the Elliquis that he came from On license of [facility]. Angelica's taking. Review of additional records showed that there was a preprocedure EKG done prior to the recent surgery at [facility]. Angelica's which showed atrial fibrillation, but this was not noted in the admission short H and P on the day of the procedure itself.     Brain 11/9 films reviewed by present physician shows a large left PCA distribution acute infarction and also a small right cerebellar hemisphere acute infarction which is somewhat patchy.      LDL 48  A1c 6.0  Echo without evidence for cardioembolic source.   Cardiology consultation 11/10 referring to the atrial flutter recommended \"anticoagulate per neurology. \"  Neuro critical care recommends restarting Eliquis 5 days from the date of the stroke which will be 11/13, and his recommendation was echoed by endovascular service, maintaining patient only on aspirin antiplatelet therapy until that time. Note that patient had an episode of atrial flutter morning 11/12. Cardiology service is following. No current facility-administered medications on file prior to encounter.       Current Outpatient Prescriptions on File Prior to Encounter   Medication Sig Dispense Refill    docusate sodium (COLACE) 100 MG capsule Take 1 capsule by mouth 2 times daily 30 capsule 0    ondansetron (ZOFRAN) 4 MG tablet Take 1 tablet by mouth every 6 hours as needed for Nausea 30 tablet 1    metFORMIN (GLUCOPHAGE) 500 MG tablet Take 500 mg by mouth 2 times daily (with meals)      atorvastatin (LIPITOR) 40 MG tablet Take 40 mg by mouth nightly      gabapentin (NEURONTIN) 300 MG capsule Take 300 mg by mouth 2 times daily      amLODIPine (NORVASC) 5 MG tablet Take 5 mg by mouth daily      lisinopril (PRINIVIL;ZESTRIL) 20 MG tablet Take 20 mg by mouth 2 times daily      Cholecalciferol (VITAMIN D3 PO) Take 1 tablet by mouth 2 times daily      Insulin NPH Isophane & Regular (HUMULIN 70/30 PEN) (70-30) 100 UNIT per ML injection pen Inject 30 Units into the skin every °C)   Resp 25   Ht 6' 1\" (1.854 m)   Wt 204 lb 2.3 oz (92.6 kg)   SpO2 97%   BMI 26.93 kg/m²     Blood pressure range: Systolic (52TRT), RBZ:445 , Min:109 , SFP:517   ; Diastolic (87BSE), UTM:53, Min:53, Max:63     ON EXAMINATION:  GENERAL  Appears comfortable and in no distress   HEENT  NC/ AT   NECK  Supple and no bruits heard   MENTAL STATUS:  Alert, partially oriented to place and time. No language parameter disturbance verbally. Some inattentiveness, some difficulty following complex commands, follows simple commands well. CRANIAL NERVES: II     -       dense RHH  III,IV,VI - PRRRE without nystagmus; EOMs full, no afferent defect, no                      RAYNA, no ptosis  V     -     Normal facial sensation  VII    -     Normal facial symmetry  VIII   -     Intact hearing  IX,X -     Symmetrical palate, could swallow  XI    -     Symmetrical shoulder shrug  XII   -     Midline tongue, no atrophy    MOTOR FUNCTION:  Normal bulk, normal tone, except that strength testing in proximal lower extremities is limited due to left hip pain, otherwise normal power;  no involuntary movements, no tremor   SENSORY FUNCTION:  Normal touch, normal pain grossly    CEREBELLAR FUNCTION:  no upper extremity limb cerebellar signs    REFLEX FUNCTION:  Symmetric, 1/4, plantars flexor    STATION and GAIT  Not tested            Data:  EXAMINATION:   CTA OF THE NECK; CTA OF THE HEAD WITH CONTRAST 11/10/2017 11:56 am       TECHNIQUE:   CTA of the neck was performed with the administration of intravenous   contrast. Multiplanar reformatted images are provided for review.  MIP images   are provided for review. Stenosis of the internal carotid arteries measured   using NASCET criteria.  Dose modulation, iterative reconstruction, and/or   weight based adjustment of the mA/kV was utilized to reduce the radiation   dose to as low as reasonably achievable.; CTA of the head/brain was performed   with the administration of intravenous contrast. Multiplanar reformatted   images are provided for review.  MIP images are provided for review. Dose   modulation, iterative reconstruction, and/or weight based adjustment of the   mA/kV was utilized to reduce the radiation dose to as low as reasonably   achievable.       COMPARISON:   None.       HISTORY:   ORDERING SYSTEM PROVIDED HISTORY: repeat CTA evaluate stenosis       FINDINGS:   Lung apices:  No new lung opacity is noted.       AORTIC ARCH/GREAT VESSELS: Arch calcifications are noted.  Great vessel   origins are patent.       CAROTID ARTERIES: Left common carotid artery is widely patent.  Punctate   calcifications are noted along its margin.  Calcifications are noted along   the margin of the left internal carotid artery.  There is no focal   significant narrowing.  Right common carotid artery is widely patent.  Small   calcifications are noted along its margin.  Right bifurcation and right   internal carotid artery are widely patent.  There is a focal area of the   nodular contour along the posterior distal aspect of the left cavernous   carotid segment on axial image 114.  This is likely volume averaging from the   curving vessel.       VERTEBRAL ARTERIES: Both vertebral arteries are widely patent.       SOFT TISSUES:  No soft tissue masses are noted.  No significant adenopathy is   noted.       BONES: Degenerative changes throughout the cervical spine are again noted.    Postop changes in the cervical spine are noted.  Canal detail limited       CT angiogram head:       Detail is limited due to artifact.  Atherosclerotic calcifications are noted   in the cavernous carotid segments bilaterally with  associated multifocal   narrowing.  Calcifications in the cavernous carotid segments limited detail   and limits assessment for more significant canal.  These findings do not   appear grossly changed.  Anterior and middle cerebral arteries are patent   bilaterally without focal new area of significant narrowing.       Artifact limits the distal vertebral arteries.  Atherosclerotic changes are   noted.  Both distal vertebral arteries are patent.  There is mild multifocal   associated distal vertebral artery narrowing.  There is no severe narrowing   noted on this exam.  Multifocal narrowing in the basilar artery is suggested   as well.  Detail is limited due to artifact.  Posterior cerebral arteries are   patent bilaterally.  Mild irregularity of the contour is noted.  This may be   artifact or due to mild multifocal narrowing.           Impression   No focal significant narrowing in the neck.  No acute abnormality       Atherosclerotic change is noted in the distal vertebral artery with mild   multifocal narrowing associated.  Mild multifocal narrowing is also suggested   in the basilar artery.       There is multifocal atherosclerotic change and narrowing suggested in the   cavernous carotid segments bilaterally as well.          TTE procedure:2D Echocardiogram, M-Mode, Doppler, Color Doppler.     Procedure Date  Date: 11/10/2017 Start: 03:46 PM    Study Location: [facility]    Miky / Erum Daniel. Comments:  S/P Hip Replacement, HTN, DM , CVA    Patient Status: Inpatient    Height: 73 inches Weight: 204 pounds BSA: 2.17 m^2 BMI: 26.91 kg/m^2    CONCLUSIONS    Summary  Left ventricle is normal in size with hyperdynamic systolic function. Increased left ventricular wall thickness. Estimated ejection fraction is 60%. Normal right ventricular size and function. Mild mitral regurgitation. Mild tricuspid regurgitation. No pericardial effusion is seen.     Signature  ----------------------------------------------------------------------------   Electronically signed by Gayle Martinez(Sonographer) on 11/10/2017 04:52   PM  Lab Results:   CBC:   Recent Labs      11/11/17   0828  11/12/17   0722  11/13/17   0725   WBC  9.1  7.6  6.9   HGB  10.4*  9.5*  10.4*   PLT  256  283  363     BMP:    Recent Labs 11/11/17   0828  11/12/17   0722  11/13/17   0725   NA  140  138  138   K  3.7  3.4*  3.7   CL  103  100  100   CO2  22  25  26   BUN  8  10  7*   CREATININE  0.60*  0.64*  0.53*   GLUCOSE  166*  151*  225*         Lab Results   Component Value Date    CHOL 98 11/09/2017    LDLCHOLESTEROL 48 11/09/2017    HDL 36 (L) 11/09/2017    TRIG 71 11/09/2017    ALT 20 03/07/2016    AST 23 03/07/2016    TSH 0.95 11/10/2017    INR 1.1 11/09/2017    LABA1C 6.0 11/09/2017    LABMICR 6 08/27/2012           Impression and Plan: Mr. Sekou Sheikh is a 67 y.o. male presenting as acute confusion post THR found to have an acute to subacute moderate-sized left occipital infarction on CT scan brain. The only CTA head and neck finding pertains to stenosis of bilateral vertebral arteries, which are not the supply of the occipital lobes. However, MRI of the brain in addition to the left PCA distribution infarction shows small acute infarction also in the right cerebellar hemisphere which would indicate the presence of an embolic source whether related to the atrial flutter noted evening 11/9 or vessel to vessel embolization from vertebrobasilar source which possibly is the source for the left PCA distribution infarction also. Repeat CTA head and neck 11/10/17 showed: no focal significant narrowing in the neck for which conventional cerebral angiogram was held.      With approximately one week delay from time of this stroke,  the patient is  to be placed on long-term anticoagulation given his MRI findings and the development of atrial flutter which recommendation has been supported by cardiology consultation; until then he is on aspirin 324 mg. 11/13 neuroendovascular service recommended Eliquis resumption 11/15, after which ASA should be d/c'd.

## 2017-11-13 NOTE — PLAN OF CARE
Problem: NEUROLOGICAL DEFICIT  Goal: Neurological status is stable or improving  Outcome: Ongoing      Problem: HEMODYNAMIC STATUS  Goal: Patient has stable vital signs and fluid balance  Outcome: Ongoing      Problem: ACTIVITY INTOLERANCE/IMPAIRED MOBILITY  Goal: Mobility/activity is maintained at optimum level for patient  Outcome: Ongoing      Problem: Falls - Risk of:  Goal: Will remain free from falls  Will remain free from falls   Outcome: Met This Shift    Goal: Absence of physical injury  Absence of physical injury   Outcome: Met This Shift      Problem: Falls - Risk of  Goal: Absence of falls  Outcome: Met This Shift

## 2017-11-13 NOTE — DISCHARGE SUMMARY
[facility]  Discharge Summary     Patient ID: Ana Phillips  :  1945   MRN: [id number]     ACCOUNT:  [de-identified]   Patient's PCP: Patrick Davies MD  Admit Date: 2017   Discharge Date: 2017    Discharge Physician: Juan Mendenhall DO     Active Discharge Diagnoses:     Primary Problem  Occipital infarction [facility])      [place] Problems    Diagnosis Date Noted    Hyperglycemia [R73.9] 2017    Cerebellar infarct ([address] - right  [I63.9] 2017    Hypokalemia [E87.6] 11/10/2017    Paroxysmal atrial fibrillation (HCC) [I48.0]     Occipital infarction ([address] [I63.9] 2017    Stenosis of right vertebral artery [I65.01] 2017    Hypertension [I10]     Controlled type 2 diabetes mellitus without complication, with long-term current use of insulin ([address] [E11.9, Z79.4] 2017    Dyslipidemia [E78.5] 2017    Essential hypertension [I10] 2017         Hospital Stay:     Hospital Course:   Admitted with:  Stroke     History Obtained From:      patient, electronic medical record     History of Present Illness:       The patient is a 67 y.o. male who presents with an occipital stroke  The patient underwent total hip arthroplasty at [address] [place] [address] on   The family reports that on postop day 1 and became confused  This progressed into grabbing and reaching at things that were not there  He was having difficulty speaking  Workup revealed and occipital stroke  He was transferred to [facility] for neurology/endovascular evaluation  Imaging demonstrates vertebral stenosis  Through the night the patient has improved  The family reports that he is essentially nearing his baseline      Initial assessment and plan:  Principal Problem:    Occipital infarction [facility])  Active Problems:    Controlled type 2 diabetes mellitus without complication, with long-term current use of insulin ([address]    Essential hypertension    Dyslipidemia    Stenosis of right vertebral artery           Plan:      [facility]  Neurology evaluation  ASA  EPC  Will monitor and control Blood Pressure  Will monitor and control Diabetes  Physical therapy and rehabilitation   Social Service consult for discharge planning  The patient's status and plan have been discussed with the patient and family at the bedside          Significant Diagnostic Studies:   Labs / Micro:   Hematology:  Recent Labs      11/11/17 0828 11/12/17 0722 11/13/17 0725   WBC  9.1  7.6  6.9   HGB  10.4*  9.5*  10.4*   HCT  32.3*  29.4*  32.4*   PLT  256  283  363     Chemistry:  Recent Labs      11/11/17 0828 11/12/17 0722 11/13/17 0725   NA  140  138  138   K  3.7  3.4*  3.7   CL  103  100  100   CO2  22  25  26   GLUCOSE  166*  151*  225*   BUN  8  10  7*   CREATININE  0.60*  0.64*  0.53*   CALCIUM  8.7  8.5*  8.5*     No results for input(s): PROT, LABALBU, LABA1C, G4GAOEE, N9XFAVB, FT4, TSH, AST, ALT, LDH, GGT, ALKPHOS, BILITOT, BILIDIR, AMMONIA, AMYLASE, LIPASE, LACTATE, CHOL, TRIG, HDL, LDLCALC, LDLDIRECT, LABVLDL, BNP, TROPONINI, CKTOTAL, CKMB, CKMBINDEX in the last 72 hours. Echo  Summary  Left ventricle is normal in size with hyperdynamic systolic function. Increased left ventricular wall thickness. Estimated ejection fraction is 60%. Normal right ventricular size and function. Mild mitral regurgitation. Mild tricuspid regurgitation. No pericardial effusion is seen. Radiology:    Cta Head W Wo Contrast    Result Date: 11/8/2017  EXAMINATION: CTA OF THE HEAD WITHOUT AND WITH CONTRAST  11/8/2017 6:05 pm TECHNIQUE: CTA of the head/brain was performed without and with the administration of intravenous contrast. Multiplanar reformatted images are provided for review. MIP images are provided for review. Dose modulation, iterative reconstruction, and/or weight based adjustment of the mA/kV was utilized to reduce the radiation dose to as low as reasonably achievable.  COMPARISON: None HISTORY: ORDERING SYSTEM PROVIDED HISTORY: acute cva FINDINGS: CT HEAD: BRAIN/VENTRICLES:  No acute intracranial hemorrhage or extraaxial fluid collection. Grey-white differentiation is maintained. No evidence of mass, mass effect or midline shift. No evidence of hydrocephalus. ORBITS: The visualized portion of the orbits demonstrate no acute abnormality. SINUSES:  The visualized paranasal sinuses and mastoid air cells demonstrate no acute abnormality. SOFT TISSUES/SKULL: No acute abnormality of the visualized skull or soft tissues. CTA HEAD: ANTERIOR CIRCULATION: There is moderate plaque causing moderate stenosis in the cavernous segments of the internal carotid arteries bilaterally. The anterior and middle cerebral arteries appear normal.  The anterior communicating artery is not well visualized. POSTERIOR CIRCULATION: There appears to be severe stenosis in the distal V4 segments of the vertebral arteries bilaterally due to mixed plaque. There is a posterior communicating artery present on the right but not the left. ANEURYSM: No intracranial aneurysm is seen. 1. No acute intracranial abnormality. 2. Severe stenosis distal V4 segments in the vertebral arteries bilaterally due to mixed plaque. RECOMMENDATIONS: Sensitivity is limited without 3D reformats. An addendum will be performed when these are available. Xr Chest Standard (2 Vw)    Result Date: 10/25/2017  EXAMINATION: TWO VIEWS OF THE CHEST 10/25/2017 12:37 pm COMPARISON: August 28, 2013 HISTORY: ORDERING SYSTEM PROVIDED HISTORY: PAT TECHNOLOGIST PROVIDED HISTORY: Patient is in pre-admission testing. Reason for exam:->PAT Ordering Physician Provided Reason for Exam: pre op Acuity: Unknown Type of Exam: Unknown Preoperative clearance for hip surgery in a patient with multiple comorbidities to include diabetes, hypertension and hyperlipidemia. FINDINGS: The heart is not enlarged. No significant pulmonary edema.   Mild elevation of the right 11/8/2017 3:38 pm TECHNIQUE: CT of the head was performed without the administration of intravenous contrast. Dose modulation, iterative reconstruction, and/or weight based adjustment of the mA/kV was utilized to reduce the radiation dose to as low as reasonably achievable. COMPARISON: None HISTORY: ORDERING SYSTEM PROVIDED HISTORY: CONFUSION/DELIRIUM, ALTERED LOC, UNEXPLAINED TECHNOLOGIST PROVIDED HISTORY: Has a \"code stroke\" or \"stroke alert\" been called? ->No FINDINGS: BRAIN/VENTRICLES: Significant streak artifact from dental hardware severely limits evaluation of the posterior fossa. There is a moderate-sized area of low attenuation in the left occipital lobe involving both gray and white matter suggestive of an acute to subacute infarct. A punctate hyperdensity centrally (image 14) is favored to represent calcification versus a tiny petechial hemorrhage. Mild regional mass effect and sulcal effacement without midline shift. Subtle less prominent low-attenuation on the right could be artifactual.  Ventricles are not dilated. Calcific plaque of intracranial vessels. Mild subtle low attenuation in the periventricular white matter is nonspecific likely due to chronic small vessel ischemia. ORBITS: The visualized portion of the orbits demonstrate no acute abnormality. Previous left lens replacement. SINUSES: The visualized paranasal sinuses and mastoid air cells demonstrate no acute abnormality. SOFT TISSUES/SKULL:  No acute abnormality of the visualized skull or soft tissues. Acute/subacute left occipital lobe infarct. Possible tiny petechial hemorrhage versus calcification. Findings were discussed with Dr. Christine At 4:01 pm on 11/8/2017. Cta Neck W Wo Contrast    Result Date: 11/8/2017  EXAMINATION: CTA OF THE NECK 11/8/2017 6:05 pm TECHNIQUE: CTA of the neck was performed with the administration of intravenous contrast. Multiplanar reformatted images are provided for review.   MIP images are provided for review. Stenosis of the internal carotid arteries measured using NASCET criteria. Dose modulation, iterative reconstruction, and/or weight based adjustment of the mA/kV was utilized to reduce the radiation dose to as low as reasonably achievable. COMPARISON: None. HISTORY: ORDERING SYSTEM PROVIDED HISTORY: acute cva FINDINGS: AORTIC ARCH/GREAT VESSELS: There is a normal branch pattern of the aortic arch. No significant stenosis is seen of the innominate artery or subclavian arteries. CAROTID ARTERIES: The common carotid arteries are normal in appearance without evidence of a flow limiting stenosis. The internal carotid arteries are normal in appearance without evidence of a flow limiting stenosis by NASCET criteria. No dissection or arterial injury is seen. Minimal plaque in the bulb on the left. VERTEBRAL ARTERIES: There is focal calcific plaque causing at least moderate stenosis at the origin of the right vertebral artery. There is moderate stenosis in the V4 segments bilaterally due to mixed plaque. SOFT TISSUES:  The lung apices are clear. No cervical or superior mediastinal lymphadenopathy. The visualized portion of the larynx and pharynx appear unremarkable. The parotid, submandibular and thyroid glands demonstrate no acute abnormality. BONES: There is moderate levoconvex scoliosis. There are laminectomies at C3 and C4. Moderate spondylosis and multilevel degenerative disc disease. Moderate to severe stenosis origin of the right vertebral artery and distal V4 segments bilaterally. RECOMMENDATIONS: Sensitivity is limited without curved multiplanar reformats. An addendum will be performed when these are available. Cta Neck W Contrast    Result Date: 11/10/2017  EXAMINATION: CTA OF THE NECK; CTA OF THE HEAD WITH CONTRAST 11/10/2017 11:56 am TECHNIQUE: CTA of the neck was performed with the administration of intravenous contrast. Multiplanar reformatted images are provided for review.   MIP images are provided for review. Stenosis of the internal carotid arteries measured using NASCET criteria. Dose modulation, iterative reconstruction, and/or weight based adjustment of the mA/kV was utilized to reduce the radiation dose to as low as reasonably achievable.; CTA of the head/brain was performed with the administration of intravenous contrast. Multiplanar reformatted images are provided for review. MIP images are provided for review. Dose modulation, iterative reconstruction, and/or weight based adjustment of the mA/kV was utilized to reduce the radiation dose to as low as reasonably achievable. COMPARISON: None. HISTORY: ORDERING SYSTEM PROVIDED HISTORY: repeat CTA evaluate stenosis FINDINGS: Lung apices:  No new lung opacity is noted. AORTIC ARCH/GREAT VESSELS: Arch calcifications are noted. Great vessel origins are patent. CAROTID ARTERIES: Left common carotid artery is widely patent. Punctate calcifications are noted along its margin. Calcifications are noted along the margin of the left internal carotid artery. There is no focal significant narrowing. Right common carotid artery is widely patent. Small calcifications are noted along its margin. Right bifurcation and right internal carotid artery are widely patent. There is a focal area of the nodular contour along the posterior distal aspect of the left cavernous carotid segment on axial image 114. This is likely volume averaging from the curving vessel. VERTEBRAL ARTERIES: Both vertebral arteries are widely patent. SOFT TISSUES:  No soft tissue masses are noted. No significant adenopathy is noted. BONES: Degenerative changes throughout the cervical spine are again noted. Postop changes in the cervical spine are noted. Canal detail limited CT angiogram head: Detail is limited due to artifact. Atherosclerotic calcifications are noted in the cavernous carotid segments bilaterally with  associated multifocal narrowing.   Calcifications in the cavernous carotid segments limited detail and limits assessment for more significant canal.  These findings do not appear grossly changed. Anterior and middle cerebral arteries are patent bilaterally without focal new area of significant narrowing. Artifact limits the distal vertebral arteries. Atherosclerotic changes are noted. Both distal vertebral arteries are patent. There is mild multifocal associated distal vertebral artery narrowing. There is no severe narrowing noted on this exam.  Multifocal narrowing in the basilar artery is suggested as well. Detail is limited due to artifact. Posterior cerebral arteries are patent bilaterally. Mild irregularity of the contour is noted. This may be artifact or due to mild multifocal narrowing. No focal significant narrowing in the neck. No acute abnormality Atherosclerotic change is noted in the distal vertebral artery with mild multifocal narrowing associated. Mild multifocal narrowing is also suggested in the basilar artery. There is multifocal atherosclerotic change and narrowing suggested in the cavernous carotid segments bilaterally as well. Fluoro For Surgical Procedures    Result Date: 11/7/2017  Radiology exam is complete. No Radiologist dictation. Please follow up with ordering provider. Cta Head W Contrast    Result Date: 11/10/2017  EXAMINATION: CTA OF THE NECK; CTA OF THE HEAD WITH CONTRAST 11/10/2017 11:56 am TECHNIQUE: CTA of the neck was performed with the administration of intravenous contrast. Multiplanar reformatted images are provided for review. MIP images are provided for review. Stenosis of the internal carotid arteries measured using NASCET criteria.  Dose modulation, iterative reconstruction, and/or weight based adjustment of the mA/kV was utilized to reduce the radiation dose to as low as reasonably achievable.; CTA of the head/brain was performed with the administration of intravenous contrast. Multiplanar reformatted There is mild multifocal associated distal vertebral artery narrowing. There is no severe narrowing noted on this exam.  Multifocal narrowing in the basilar artery is suggested as well. Detail is limited due to artifact. Posterior cerebral arteries are patent bilaterally. Mild irregularity of the contour is noted. This may be artifact or due to mild multifocal narrowing. No focal significant narrowing in the neck. No acute abnormality Atherosclerotic change is noted in the distal vertebral artery with mild multifocal narrowing associated. Mild multifocal narrowing is also suggested in the basilar artery. There is multifocal atherosclerotic change and narrowing suggested in the cavernous carotid segments bilaterally as well. Mri Brain Wo Contrast    Result Date: 11/9/2017  EXAMINATION: MRI OF THE BRAIN WITHOUT CONTRAST  11/9/2017 12:25 pm TECHNIQUE: Multiplanar multisequence MRI of the brain was performed without the administration of intravenous contrast. COMPARISON: None. HISTORY: ORDERING SYSTEM PROVIDED HISTORY: eval stroke FINDINGS: INTRACRANIAL STRUCTURES/VENTRICLES: Ventricles and sulci are normal in caliber. A few small foci of increased T2 and FLAIR signal are noted in the periventricular and subcortical white matter bilaterally. More focal increased T2 and FLAIR signal is noted in the left occipital parasagittal region with extension into the posterior medial left temporal lobe. Corresponding hyperintense diffusion signal is noted in the left occipital pole with extension into the left posterior medial temporal region as well. Small foci of increased diffusion signal are also noted in the right cerebellar hemisphere. Small foci of correlating increased T2 and FLAIR signal are also noted. Flow voids are patent. Curvilinear dark gradient echo signal in the left occipital pole is noted raising the question of a small amount of hemorrhage. Detail in this region is limited due to artifact. ORBITS: The visualized portion of the orbits demonstrate no acute abnormality. SINUSES: The visualized paranasal sinuses and mastoid air cells are well aerated. BONES/SOFT TISSUES: The bone marrow signal intensity appears normal. The craniocervical junction is normal in appearance. Multifocal recent infarcts, largest in the left occipital pole. See above for details. Curvilinear dark gradient echo signal in the left occipital region raising the question of a small amount of hemorrhage. Consultations:    Consults:     Final Specialist Recommendations/Findings:   IP CONSULT TO NEUROLOGY  IP CONSULT TO SOCIAL WORK  IP CONSULT TO CARDIOLOGY      The patient was seen and examined on day of discharge and this discharge summary is in conjunction with any daily progress note from day of discharge.     Discharge plan:   Discharge planning:  The patient responded to medical treatment  They were discharged in improved condition  The patient is a 67 y.o. male who presents with an occipital / Cerebellar stroke  The patient underwent total hip arthroplasty at [address] [place] [address] on 11/7  The family reports that on postop day 1 and became confused  This progressed into grabbing and reaching at things that were not there  He was having difficulty speaking  Workup revealed an occipital stroke  He was transferred to [address] [place] [address] for neurology/endovascular evaluation  Imaging demonstrates vertebral stenosis  The family has reported some underlying cognitive impairment with memory  There has been concern regarding A. Fib/flutter and embolic phenomenon  His Eliquis is on hold because of the recent infarct and concerns of hemorrhagic conversion    Telesitter DC'd  Potassium supplementation prn  Monitor glucose  Neurology evaluation  ASA  ( Eliquis on hold - on hold for 1 week)  Cardiology consultation - AMY/cardioversion planned on an outpatient basis  Anticoagulation per cardiology  EPCs  Will monitor and control Blood

## 2017-11-13 NOTE — PROGRESS NOTES
Physical Therapy  Facility/Department: [facility]O  Daily Treatment Note  NAME: Es Mason  : 1945  MRN: [id number]    Date of Service: 2017    Patient Diagnosis(es):   Patient Active Problem List    Diagnosis Date Noted    Hyperglycemia 2017    Cerebellar infarct ([address] - right  2017    Hypokalemia 11/10/2017    Paroxysmal atrial fibrillation ([address]     Occipital infarction ([address] 2017    Stenosis of right vertebral artery 2017    Hypertension     Acute metabolic encephalopathy     Arthralgia of left hip 2017    Controlled type 2 diabetes mellitus without complication, with long-term current use of insulin ([address] 2017    Essential hypertension 2017    Dyslipidemia 2017       Past Medical History:   Diagnosis Date    Arthritis     Diabetes ([address]     Hearing aid worn     bilateral    [place] (hard of hearing)     Wears bilateral Hearing Aids    Hyperlipidemia     Hypertension     Occipital infarction ([address] 2017     Past Surgical History:   Procedure Laterality Date    BACK SURGERY      lumbar spine    CATARACT REMOVAL Right 2016    CHOLECYSTECTOMY      NECK SURGERY      TOTAL HIP ARTHROPLASTY Left 2017    LEFT HIP TOTAL ARTHROPLASTY ANTERIOR APPROACH     TOTAL HIP ARTHROPLASTY Left 2017    LEFT HIP TOTAL ARTHROPLASTY ANTERIOR APPROACH - BIOMET (TANMAY) MARIA LUISA performed by Esther Khan MD [facility]  Restrictions/Precautions  Restrictions/Precautions: Weight Bearing, General Precautions  Required Braces or Orthoses?: No  Lower Extremity Weight Bearing Restrictions  Left Lower Extremity Weight Bearing: Weight Bearing As Tolerated  Position Activity Restriction  Other position/activity restrictions: Anterior approach CHRISTIANO - no straight leg raises  Subjective   General  Chart Reviewed: Yes  Response To Previous Treatment: Patient with no complaints from previous session.   Family / Caregiver Present: Yes  Subjective  Subjective: Pt alert in bed; agreeable to therapy. Offers no complaints of pain. General Comment  Comments: Pt left in chair at end of session with chair alarm on and BLEs elevated; call light in reach. Pain Screening  Patient Currently in Pain: No  Vital Signs  Patient Currently in Pain: No       Orientation  Orientation  Overall Orientation Status: Within Functional Limits  Orientation Level: Oriented X4  Objective   Bed mobility  Supine to Sit: Contact guard assistance (HOB partially elevated)  Scooting: Contact guard assistance  Transfers  Sit to Stand: Stand by assistance  Stand to sit: Stand by assistance, decreased safety awareness, cues to reach back prior to sitting  Ambulation  Ambulation?: Yes  Ambulation 1  Surface: level tile  Device: Rolling Walker  Assistance: Minimal assistance;Contact guard assistance  Quality of Gait: decreased pattie, Evelina required to manuever RW with turns otherwise pt ambulated with CGA  Distance: 300ft  Stairs/Curb  Stairs?: No     Balance  Posture: Fair  Sitting - Static: Good  Sitting - Dynamic: Good  Standing - Static: Good;-  Standing - Dynamic: Fair;+ (with RW)       Exercises:  Seated LE exercise program: Long Arc Quads, hip abduction/adduction, heel/toe raises, and marches. Reps: 20x each within hip precautions      Assessment   Body structures, Functions, Activity limitations: Decreased functional mobility ; Decreased endurance;Decreased strength;Decreased safe awareness  Assessment: Pt with decreased safety awareness and required moderate vc's for safety. Displays no LOB during ambulation but requires CGA d/t unsteadiness especially with turns.   Prognosis: Good  Patient Education: hip precautions- poor follow thru; pt attempting to cross ankles  REQUIRES PT FOLLOW UP: Yes  Activity Tolerance  Activity Tolerance: Patient Tolerated treatment well       Discharge Recommendations:  IP Rehab     Goals  Short term goals  Time Frame for Short

## 2017-11-13 NOTE — PROGRESS NOTES
Occupational Therapy  Facility/Department: [facility]  Daily Treatment Note  NAME: Jacqui Pretty  : 1945  MRN: [id number]    Date of Service: 2017    Patient Diagnosis(es): There were no encounter diagnoses. has a past medical history of Arthritis; Diabetes ([facility] [address] [facility] Hearing aid worn; [place] (hard of hearing); Hyperlipidemia; Hypertension; and Occipital infarction ([facility] [address] has a past surgical history that includes Neck surgery; back surgery; Cholecystectomy; Cataract removal (Right, 2016); Total hip arthroplasty (Left, 2017); and Total hip arthroplasty (Left, 2017). Restrictions  Restrictions/Precautions  Restrictions/Precautions: Weight Bearing, General Precautions  Required Braces or Orthoses?: No  Lower Extremity Weight Bearing Restrictions  Left Lower Extremity Weight Bearing: Weight Bearing As Tolerated  Position Activity Restriction  Other position/activity restrictions: Anterior approach CHRISTIANO - no straight leg raises  Subjective   General  Patient assessed for rehabilitation services?: Yes  Family / Caregiver Present: No  Diagnosis: L occipital lobe infarct, L CHRISTIANO   Pain Assessment  Patient Currently in Pain: No  Vital Signs  Patient Currently in Pain: No   Orientation  Orientation  Overall Orientation Status: Within Functional Limits  Objective    Pt used HIBICLENS for bathing soap on this date. Pt used RW for support safety while functional mob in room/hallway. Pt dressed in personal clothes on this date.   ADL  Grooming: Stand by assistance (stood at sink)  UE Bathing: Supervision  LE Bathing: Minimal assistance  UE Dressing: Setup;Supervision  LE Dressing: Setup;Minimal assistance  Toileting: Setup;Supervision  Additional Comments: pt sat on shower chair to complete a shower and used shower grab bars for support/safety while standing for ramon care/backside mgt   Balance  Sitting Balance: Independent  Standing Balance: Contact guard assistance (w/RW)  Standing Balance  Sit to Therapy Time   Individual Concurrent Group Co-treatment   Time In  10:40         Time Out  11:42         75 SHOLA Morales/L

## 2017-11-13 NOTE — PROGRESS NOTES
MD   tamsulosin (FLOMAX) 0.4 MG capsule Take 0.4 mg by mouth nightly    Historical Provider, MD   metoprolol succinate (TOPROL XL) 50 MG extended release tablet Take 50 mg by mouth daily    Historical Provider, MD    Scheduled Meds:   famotidine  20 mg Oral BID    aspirin  324 mg Oral Daily    metoprolol tartrate  50 mg Oral BID    digoxin  250 mcg Oral Daily    enoxaparin  40 mg Subcutaneous Daily    docusate sodium  100 mg Oral BID    sodium chloride flush  10 mL Intravenous 2 times per day    atorvastatin  40 mg Oral Nightly    insulin lispro  0-12 Units Subcutaneous TID WC    insulin lispro  0-6 Units Subcutaneous Nightly    tamsulosin  0.4 mg Oral Nightly    labetalol  10 mg Intravenous Once     Continuous Infusions:   sodium chloride 75 mL/hr at 11/09/17 1620    dextrose       PRN Meds:.potassium chloride **OR** potassium chloride **OR** potassium chloride, potassium chloride **OR** potassium chloride **OR** potassium chloride, acetaminophen, ondansetron, sodium chloride flush, dextrose, dextrose, glucagon (rDNA), glucose, HYDROcodone 5 mg - acetaminophen, hydrOXYzine, LORazepam, sodium chloride flush    OBJECTIVE:   Vitals: BP (!) 131/55   Pulse 89   Temp 97.9 °F (36.6 °C) (Oral)   Resp 21   Ht 6' 1\" (1.854 m)   Wt 204 lb 2.3 oz (92.6 kg)   SpO2 96%   BMI 26.93 kg/m²     On exam today:   Patient is not reliable, AAO to self, age but not to location nor situation. Following simple commands. CN II-XII significant for right homonymous hemianopsia. Pupils 2 mm reactive to light bilaterally. Normal tone in four extremities. Normal sensory exam to LT and pain. Muscle strength is 5/5 in 4 extremities. DTRs : +1 in bilateral biceps and knees. Cerebellar exam: No nystagmus. Lungs sounds clear to auscultation bilaterally. Heart exam: normal S1,S2 sounds,RRR,no murmers. Abdomen was soft, NT,ND with positive bowel sounds. Normal bilateral radial and pedal pulses.        LABS:

## 2017-11-13 NOTE — PLAN OF CARE
Problem: Falls - Risk of  Goal: Absence of falls  Outcome: Ongoing  Patient will remain free from injury/falls this shift. Will continue to monitor.

## 2017-11-13 NOTE — PROGRESS NOTES
for cough and hemoptysis. Cardiovascular: Negative for chest pain and palpitations. Gastrointestinal: Negative for blood in stool, melena, nausea and vomiting. Genitourinary: Negative for flank pain and hematuria. Musculoskeletal: Positive for joint pain and myalgias. His incisional pain is controlled   Neurological: Negative for dizziness, sensory change, speech change, weakness and headaches. Psychiatric/Behavioral: Positive for memory loss. The patient is nervous/anxious. Physical Examination:        Vitals:  BP (!) 109/59   Pulse 93   Temp 97.6 °F (36.4 °C) (Oral)   Resp 25   Ht 6' 1\" (1.854 m)   Wt 204 lb 2.3 oz (92.6 kg)   SpO2 97%   BMI 26.93 kg/m²   Temp (24hrs), Av.5 °F (36.9 °C), Min:97.6 °F (36.4 °C), Max:99.4 °F (37.4 °C)    Recent Labs      17   1745  17   2026  17   0740  17   1224   POCGLU  202*  212*  229*  194*       Physical Exam   Constitutional: No distress. HENT:   Head: Normocephalic and atraumatic. Eyes: Conjunctivae are normal. No scleral icterus. He has had some visual field loss   Neck: No tracheal deviation present. No thyromegaly present. Cardiovascular: Normal rate and regular rhythm. Pulmonary/Chest: Effort normal and breath sounds normal. No respiratory distress. He has no wheezes. Abdominal: Soft. Bowel sounds are normal. He exhibits no distension. There is no tenderness. Musculoskeletal: He exhibits no edema or tenderness. Neurological: He is alert. He exhibits normal muscle tone. The patient is having trouble providing historical data   are fairly strong  Fine motor coordination is decreased   Skin: Skin is warm and dry. He is not diaphoretic. Wound is dressed, dry and clean          Data:     I/O (24Hr):     Intake/Output Summary (Last 24 hours) at 17 1835  Last data filed at 17 0930   Gross per 24 hour   Intake             1110 ml   Output              900 ml   Net              210 ml       Labs:    Hematology:  Recent Labs      11/11/17   0828  11/12/17   0722  11/13/17   0725   WBC  9.1  7.6  6.9   HGB  10.4*  9.5*  10.4*   HCT  32.3*  29.4*  32.4*   PLT  256  283  363     Chemistry:  Recent Labs      11/11/17   0828  11/12/17   0722  11/13/17   0725   NA  140  138  138   K  3.7  3.4*  3.7   CL  103  100  100   CO2  22  25  26   GLUCOSE  166*  151*  225*   BUN  8  10  7*   CREATININE  0.60*  0.64*  0.53*   CALCIUM  8.7  8.5*  8.5*     No results for input(s): PROT, LABALBU, LABA1C, D9GKSHJ, S4REZDK, FT4, TSH, AST, ALT, LDH, GGT, ALKPHOS, BILITOT, BILIDIR, AMMONIA, AMYLASE, LIPASE, LACTATE, CHOL, TRIG, HDL, LDLCALC, LDLDIRECT, LABVLDL, BNP, TROPONINI, CKTOTAL, CKMB, CKMBINDEX in the last 72 hours. Lab Results   Component Value Date/Time    SPECIAL NOT REPORTED 10/25/2017 11:48 AM     Lab Results   Component Value Date/Time    CULTURE NO GROWTH 10/25/2017 11:48 AM    CULTURE  10/25/2017 11:48 AM     Performed at [address] [place] [address], [address] [place] [address] [phone]       No results found for: POCPH, PHART, PH, POCPCO2, SJJ6RHI, PCO2, POCPO2, PO2ART, PO2, POCHCO3, LPR5IVQ, HCO3, NBEA, PBEA, BEART, BE, THGBART, THB, EWQ9GMX, YSOQ4LYA, O2HZUJPN, O2SAT, FIO2    Radiology:  CTA  No focal significant narrowing in the neck. No acute abnormality  Atherosclerotic change is noted in the distal vertebral artery with mild  multifocal narrowing associated. Mild multifocal narrowing is also suggested  in the basilar artery. There is multifocal atherosclerotic change and narrowing suggested in the  cavernous carotid segments bilaterally as well    MRI  Impression:        Multifocal recent infarcts, largest in the left occipital pole. See above  for details. Curvilinear dark gradient echo signal in the left occipital region raising  the question of a small amount of hemorrhage. Echo  Summary  Left ventricle is normal in size with hyperdynamic systolic function.   Increased left ventricular wall thickness. Estimated ejection fraction is 60%. Normal right ventricular size and function. Mild mitral regurgitation. Mild tricuspid regurgitation. No pericardial effusion is seen. Assessment:        Primary Problem  Principal Problem:    Occipital infarction [facility])  Active Problems:    Controlled type 2 diabetes mellitus without complication, with long-term current use of insulin (HCC)    Essential hypertension    Dyslipidemia    Stenosis of right vertebral artery    Hypertension    Paroxysmal atrial fibrillation (HCC)    Hypokalemia    Cerebellar infarct ([facility]) - right     Hyperglycemia      Plan:        Potassium supplementation  Monitor glucose  Neurology evaluation  ASA  ( Eliquis on hold - on hold for 1 week)  Cardiology consultation - AMY/cardioversion planned on an outpatient basis  Anticoagulation per cardiology- eliquis to be started on 11/15   EPCs  Will monitor and control Blood Pressure  Will monitor and control Diabetes  Physical therapy and rehabilitation   Social Service consult for discharge planning  Discharge planning to the [address] [place] [address]  Will discharge when arrangements complete and ok with other services.   Follow-up with PCP in one week, Anai Horne MD  Follow-up with Dr. Antnoio Sung with Dr. Mariela Platt with Dr. Coco Valente MD  11/13/2017  6:35 PM

## 2017-12-13 ENCOUNTER — OFFICE VISIT (OUTPATIENT)
Dept: NEUROLOGY | Age: 72
End: 2017-12-13
Payer: MEDICARE

## 2017-12-13 VITALS
WEIGHT: 187.8 LBS | BODY MASS INDEX: 24.89 KG/M2 | HEART RATE: 68 BPM | DIASTOLIC BLOOD PRESSURE: 74 MMHG | HEIGHT: 73 IN | SYSTOLIC BLOOD PRESSURE: 136 MMHG

## 2017-12-13 DIAGNOSIS — F01.50 VASCULAR DEMENTIA WITHOUT BEHAVIORAL DISTURBANCE (HCC): ICD-10-CM

## 2017-12-13 DIAGNOSIS — I48.0 PAROXYSMAL ATRIAL FIBRILLATION (HCC): Primary | ICD-10-CM

## 2017-12-13 DIAGNOSIS — I63.9 OCCIPITAL INFARCTION (HCC): ICD-10-CM

## 2017-12-13 PROCEDURE — 4040F PNEUMOC VAC/ADMIN/RCVD: CPT | Performed by: NURSE PRACTITIONER

## 2017-12-13 PROCEDURE — 99214 OFFICE O/P EST MOD 30 MIN: CPT | Performed by: NURSE PRACTITIONER

## 2017-12-13 PROCEDURE — 1111F DSCHRG MED/CURRENT MED MERGE: CPT | Performed by: NURSE PRACTITIONER

## 2017-12-13 PROCEDURE — G8427 DOCREV CUR MEDS BY ELIG CLIN: HCPCS | Performed by: NURSE PRACTITIONER

## 2017-12-13 PROCEDURE — 1123F ACP DISCUSS/DSCN MKR DOCD: CPT | Performed by: NURSE PRACTITIONER

## 2017-12-13 PROCEDURE — 1036F TOBACCO NON-USER: CPT | Performed by: NURSE PRACTITIONER

## 2017-12-13 PROCEDURE — G8484 FLU IMMUNIZE NO ADMIN: HCPCS | Performed by: NURSE PRACTITIONER

## 2017-12-13 PROCEDURE — G8420 CALC BMI NORM PARAMETERS: HCPCS | Performed by: NURSE PRACTITIONER

## 2017-12-13 PROCEDURE — G8598 ASA/ANTIPLAT THER USED: HCPCS | Performed by: NURSE PRACTITIONER

## 2017-12-13 PROCEDURE — 3017F COLORECTAL CA SCREEN DOC REV: CPT | Performed by: NURSE PRACTITIONER

## 2017-12-13 NOTE — PROGRESS NOTES
Surgical History:   Procedure Laterality Date    BACK SURGERY      lumbar spine    CATARACT REMOVAL Right 11/2016    CHOLECYSTECTOMY      NECK SURGERY      TOTAL HIP ARTHROPLASTY Left 11/07/2017    LEFT HIP TOTAL ARTHROPLASTY ANTERIOR APPROACH     TOTAL HIP ARTHROPLASTY Left 11/7/2017    LEFT HIP TOTAL ARTHROPLASTY ANTERIOR APPROACH - BIOMET (TANMAY) MARIA LUISA performed by Imelda Zimmer MD at [address] History   Problem Relation Age of Onset    Stroke Mother     Heart Attack Mother     Stroke Father        Social History   Substance Use Topics    Smoking status: Never Smoker    Smokeless tobacco: Never Used    Alcohol use Yes      Comment: Rare                               REVIEW OF SYSTEMS    CONSTITUTIONAL Weight: absent, Appetite: absent, Fatigue: absent      HEENT Ears: normal, Visual disturbance: absent   RESPIRATORY Shortness of breath: absent, Cough: absent   CARDIOVASCULAR Chest pain: absent, Leg swelling :absent      GI Constipation: absent, Diarrhea: absent, Swallowing change: absent       Urinary frequency: present, Urinary urgency: absent, Urinary incontinence: absent   MUSCULOSKELETAL Neck pain: absent, Back pain: absent, Stiffness: absent, Muscle pain: absent, Joint pain: absent Restless legs: absent   DERMATOLOGIC Hair loss: absent, Skin changes: absent   NEUROLOGIC Memory loss: absent, Confusion: absent, Seizures: absent Trouble walking or imbalance: absent, Dizziness: absent, Weakness: absent, Numbness: absent Tremor: absent, Spasm: absent, Speech difficulty: present, Headache: absent, Light sensitivity: absent   PSYCHIATRIC Anxiety: absent, Hallucination: absent, Mood disorder: absent   HEMATOLOGIC Abnormal bleeding: absent, Anemia: absent, Clotting disorder: absent, Lymph gland changes: absent           No Known Allergies        Current Outpatient Prescriptions   Medication Sig Dispense Refill    aspirin 81 MG tablet Take 81 mg by mouth daily      digoxin (LANOXIN) 250 MCG plantar: normal         12/13/2017  Maximum score 5 Score 3 What is the year, season, date, day, month   Maximum score 5 Score 4 Where are we: State, county, town, hospital, floor? Maximum score 3 Score 3 Name 3 objects: apple, table,eli. Ask patient to repeat 3 objects. Maximum score 5 Score 3 Serial sevens from 100:93, 86, 79, 72, 65  (or) spell WORLD backwards   Maximum score 3 Score 1 Recall 3 objects   Maximum score 2 Score 2 Name a pencil and watch. Maximum score 1 Score 1 Repeat the following: No ifs ands or buts.      Maximum score 3 Score 3 Follow 3 stage command take a paper in your hand, fold it in half, and put it on the table. Maximum score 1  Score 1 Read and obey the following: \"Close your eyes\"     Maximum score 1 Score 1 Write a sentence. Maximum score 1 Score -0 Copy a design below. Max 30   Patients score 22      ASSESSMENT/PLAN:       In summary, your patientBrian exhibits the following, with associated plan:    1. Recent cardioembolic stroke, primarily involving the left occipital lobe, and right cerebellum. This is secondary to paroxysmal atrial fibrillation/flutter  1. Patient to remain on Eliquis  2. Continue atorvastatin  3. Discontinue aspirin per direction of stroke intervention service, since his stroke was cardioembolic. 2. Dementia, most likely vascular. Folstein Mini-Mental Status exam score was 22/30. 1. Discussed options of taking cholinesterase inhibitors for treatment of his dementia. The decision was made to wait until patient finishes speech therapy for cognitive rehabilitation. We will reevaluate him in 3 months and if there is no improvement, we will begin medications at that time.             Signed: Liz Pro, CNP

## 2017-12-13 NOTE — LETTER
Diagnosis Date    Arthritis     Diabetes ([address]     Hearing aid worn     bilateral    [place] (hard of hearing)     Wears bilateral Hearing Aids    Hyperlipidemia     Hypertension     Occipital infarction ([address] 11/9/2017         Past Surgical History:   Procedure Laterality Date    BACK SURGERY      lumbar spine    CATARACT REMOVAL Right 11/2016    CHOLECYSTECTOMY      NECK SURGERY      TOTAL HIP ARTHROPLASTY Left 11/07/2017    LEFT HIP TOTAL ARTHROPLASTY ANTERIOR APPROACH     TOTAL HIP ARTHROPLASTY Left 11/7/2017    LEFT HIP TOTAL ARTHROPLASTY ANTERIOR APPROACH - BIOMET (TANMAY) MARIA LUISA performed by Christina Nguyen MD at [address] History   Problem Relation Age of Onset    Stroke Mother     Heart Attack Mother     Stroke Father        Social History   Substance Use Topics    Smoking status: Never Smoker    Smokeless tobacco: Never Used    Alcohol use Yes      Comment: Rare                               REVIEW OF SYSTEMS    CONSTITUTIONAL Weight: absent, Appetite: absent, Fatigue: absent      HEENT Ears: normal, Visual disturbance: absent   RESPIRATORY Shortness of breath: absent, Cough: absent   CARDIOVASCULAR Chest pain: absent, Leg swelling :absent      GI Constipation: absent, Diarrhea: absent, Swallowing change: absent       Urinary frequency: present, Urinary urgency: absent, Urinary incontinence: absent   MUSCULOSKELETAL Neck pain: absent, Back pain: absent, Stiffness: absent, Muscle pain: absent, Joint pain: absent Restless legs: absent   DERMATOLOGIC Hair loss: absent, Skin changes: absent   NEUROLOGIC Memory loss: absent, Confusion: absent, Seizures: absent Trouble walking or imbalance: absent, Dizziness: absent, Weakness: absent, Numbness: absent Tremor: absent, Spasm: absent, Speech difficulty: present, Headache: absent, Light sensitivity: absent   PSYCHIATRIC Anxiety: absent, Hallucination: absent, Mood disorder: absent HEMATOLOGIC Abnormal bleeding: absent, Anemia: absent, Clotting disorder: absent, Lymph gland changes: absent           No Known Allergies        Current Outpatient Prescriptions   Medication Sig Dispense Refill    aspirin 81 MG tablet Take 81 mg by mouth daily      digoxin (LANOXIN) 250 MCG tablet Take 1 tablet by mouth daily 30 tablet 3    docusate sodium (COLACE) 100 MG capsule Take 1 capsule by mouth 2 times daily 30 capsule 0    ondansetron (ZOFRAN) 4 MG tablet Take 1 tablet by mouth every 6 hours as needed for Nausea 30 tablet 1    metFORMIN (GLUCOPHAGE) 500 MG tablet Take 500 mg by mouth 2 times daily (with meals)      atorvastatin (LIPITOR) 40 MG tablet Take 40 mg by mouth nightly      gabapentin (NEURONTIN) 300 MG capsule Take 300 mg by mouth 2 times daily      amLODIPine (NORVASC) 5 MG tablet Take 5 mg by mouth daily      lisinopril (PRINIVIL;ZESTRIL) 20 MG tablet Take 20 mg by mouth 2 times daily      Cholecalciferol (VITAMIN D3 PO) Take 1 tablet by mouth 2 times daily      Insulin NPH Isophane & Regular (HUMULIN 70/30 PEN) (70-30) 100 UNIT per ML injection pen Inject 30 Units into the skin every morning      Insulin NPH Isophane & Regular (HUMULIN 70/30 PEN) (70-30) 100 UNIT per ML injection pen Inject 22 Units into the skin nightly      tamsulosin (FLOMAX) 0.4 MG capsule Take 0.4 mg by mouth nightly      metoprolol succinate (TOPROL XL) 50 MG extended release tablet Take 50 mg by mouth daily       No current facility-administered medications for this visit. PHYSICAL EXAMINATION       There were no vitals filed for this visit.                                            .                                                                                                    General Appearance:  Alert, cooperative, no signs of distress, appears stated age   Head:  Normocephalic, no signs of trauma   Eyes:  Conjunctiva/corneas clear;  eyelids intact Ears:  Normal external ear and canals   Nose: Nares normal, mucosa normal, no drainage    Throat: Lips and tongue normal; teeth normal;  gums normal   Neck: Supple, intact flexion, extension and rotation;   trachea midline;  no adenopathy;   thyroid: not enlarged;   no carotid pulse abnormality   Back:   Symmetric, no curvature, ROM adequate   Lungs:   Respirations unlabored   Heart:  Regular rate and rhythm           Extremities: Extremities normal, no cyanosis, no edema   Pulses: Symmetric over head and neck   Skin: Skin color, texture normal, no rashes, no lesions                                             NEUROLOGIC EXAMINATION    Neurologic Exam     Mental Status   Oriented to person, place, and time. Oriented to person. Oriented to place. Oriented to city. Oriented to time. Disoriented to date and day. Oriented to year and month. Registration: recalls 3 of 3 objects. Recall at 5 minutes: recalls 1 of 3 objects. Attention: normal.   Speech: speech is normal   Level of consciousness: alert  Normal comprehension. Cranial Nerves     CN II   Right visual field deficit: upper temporal and lower temporal quadrant(s)  Left visual field deficit: upper nasal and lower nasal quadrant(s)    CN III, IV, VI   Pupils are equal, round, and reactive to light. Extraocular motions are normal.     CN V   Facial sensation intact. CN VII   Facial expression full, symmetric. CN VIII   CN VIII normal.     CN IX, X   CN IX normal.     CN XI   CN XI normal.     CN XII   CN XII normal.     Motor Exam   Muscle bulk: normal  Overall muscle tone: normal  Right arm tone: normal  Left arm tone: normal  Right arm pronator drift: absent  Left arm pronator drift: absent  Right leg tone: normal  Left leg tone: normal    Strength   Strength 5/5 throughout.      Sensory Exam   Light touch normal.   Vibration normal.   Pinprick normal.     Gait, Coordination, and Reflexes     Gait  Gait: normal    Coordination Finger to nose coordination: normal    Tremor   Resting tremor: absent    Reflexes   Right brachioradialis: 2+  Left brachioradialis: 2+  Right biceps: 2+  Left biceps: 2+  Right triceps: 2+  Left triceps: 2+  Right patellar: 2+  Left patellar: 2+  Right achilles: 2+  Left achilles: 2+  Right plantar: normal  Left plantar: normal         12/13/2017  Maximum score 5 Score 3 What is the year, season, date, day, month   Maximum score 5 Score 4 Where are we: State, county, town, hospital, floor? Maximum score 3 Score 3 Name 3 objects: apple, table,eli. Ask patient to repeat 3 objects. Maximum score 5 Score 3 Serial sevens from 100:93, 86, 79, 72, 65  (or) spell WORLD backwards   Maximum score 3 Score 1 Recall 3 objects   Maximum score 2 Score 2 Name a pencil and watch. Maximum score 1 Score 1 Repeat the following: No ifs ands or buts.      Maximum score 3 Score 3 Follow 3 stage command take a paper in your hand, fold it in half, and put it on the table. Maximum score 1  Score 1 Read and obey the following: \"Close your eyes\"     Maximum score 1 Score 1 Write a sentence. Maximum score 1 Score -0 Copy a design below. Max 30   Patients score 22      ASSESSMENT/PLAN:       In summary, your patient, Rose Hunt exhibits the following, with associated plan:    1. Recent cardioembolic stroke, primarily involving the left occipital lobe, and right cerebellum. This is secondary to paroxysmal atrial fibrillation/flutter  1. Patient to remain on Eliquis  2. Continue atorvastatin  3. Discontinue aspirin per direction of stroke intervention service, since his stroke was cardioembolic. 2. Dementia, most likely vascular. Folstein Mini-Mental Status exam score was 22/30. 1. Discussed options of taking cholinesterase inhibitors for treatment of his dementia. The decision was made to wait until patient finishes speech therapy for cognitive rehabilitation.   We will reevaluate him in 3 months

## 2017-12-14 ENCOUNTER — TELEPHONE (OUTPATIENT)
Dept: NEUROLOGY | Age: 72
End: 2017-12-14

## 2020-01-16 NOTE — CONSULTS
[address]  Neuro ICU Admission Note  Date: 2017  Time: 1:38 AM    Patient Name: Rose Hunt  Patient : 1945  Room/Bed: [facility]/0471-84  Allergies:  No Known Allergies    Chief Complaint:   AMS    Interval History:   Patient had total L hip replacement at [facility]. [facility] on . Per charting, the nurse reported that he had some episodes of confusion the morning of , but was still oriented x3. Due to persistent confusion throughout the day, Internal Medicine was consulted. Due to the fact that he had worsening confusion, as well as hallucinations, a CT head was performed which showed acute/subacute L occipital lobe infarct, possible tiny petechial hemorrhage vs. Calcification. Dr. Mckenzie Leyva of neuroendovascular was consulted and ordered CTA head and neck. CTA's show severe stenosis distal V4 segments in the vertebral arteries bilaterally due to mixed plaque. Dr. Mckenzie Leyva informed of results and patient was transferred to [facility] with plan for diagnostic angio and MRI Brain.     Reason for Admission to Neuro ICU:  Acute/subacute occipital stroke    Admitting to Neuro ICU from:  [facility]    Primary Care Physician / Group:  [facility]    Consult Physicians:  Neurology: Dr. Darwin Díaz  Endovascular Neuro: Dr. Mckenzie Leyva    Current Medications:  Scheduled Meds:   docusate sodium  100 mg Oral BID    sodium chloride flush  10 mL Intravenous 2 times per day    amLODIPine  5 mg Oral Daily    atorvastatin  40 mg Oral Nightly    insulin lispro  0-12 Units Subcutaneous TID WC    insulin lispro  0-6 Units Subcutaneous Nightly    lisinopril  20 mg Oral BID    metoprolol succinate  50 mg Oral Daily    tamsulosin  0.4 mg Oral Nightly    labetalol  10 mg Intravenous Once       Continuous Infusions:   sodium chloride 125 mL/hr at 17 2224    dextrose         PRN Meds:  acetaminophen, ondansetron, sodium chloride flush, dextrose, dextrose, glucagon (rDNA), glucose, HYDROcodone 5 rash     INITIAL NIH STROKE SCALE    Time Performed:  583 PM    Administer stroke scale items in the order listed. Record performance in each category after each subscale exam. Do not go back and change scores. Follow directions provided for each exam technique. Scores should reflect what the patient does, not what the clinician thinks the patient can do. The clinician should record answers while administering the exam and work quickly. Except where indicated, the patient should not be coached (i.e., repeated requests to patient to make a special effort). 1a.  Level of consciousness: 2  1b. Level of consciousness questions:  0 - answers both questions correctly  1c. Level of consciousness questions:  0 - performs both tasks correctly  2. Best Gaze:  0 - normal  3. Visual:  0 - no visual loss  4. Facial Palsy:  0 - normal symmetric movement  5a. Motor left arm:  0 - no drift, limb holds 90 (or 45) degrees for full 10 seconds  5b. Motor right arm:  0 - no drift, limb holds 90 (or 45) degrees for full 10 seconds  6a. Motor left le - no drift; leg holds 30 degree position for full 5 seconds  6b. Motor right le - no drift; leg holds 30 degree position for full 5 seconds  7. Limb Ataxia:  0 - absent  8. Sensory:  0 - normal; no sensory loss  9. Best Language:  0 - no aphasia, normal  10. Dysarthria:  0 - normal  11.   Extinction and Inattention:  0 - no abnormality    TOTAL:  2, was able to eventually complete exam but took a long time due to patient frequently falling asleep, waxing and waning attention    Labs: (last 48 hours)  Recent Results (from the past 48 hour(s))   POC Glucose Fingerstick    Collection Time: 17  6:44 AM   Result Value Ref Range    POC Glucose 146 (H) 75 - 110 mg/dL   POC Glucose Fingerstick    Collection Time: 17 11:43 AM   Result Value Ref Range    POC Glucose 181 (H) 75 - 110 mg/dL   BUN & CREATININE    Collection Time: 17  3:33 PM   Result Collection Time: 11/08/17 11:36 AM   Result Value Ref Range    POC Glucose 176 (H) 75 - 110 mg/dL   POC Glucose Fingerstick    Collection Time: 11/08/17  4:50 PM   Result Value Ref Range    POC Glucose 169 (H) 75 - 110 mg/dL   POC Glucose Fingerstick    Collection Time: 11/08/17 10:35 PM   Result Value Ref Range    POC Glucose 174 (H) 75 - 110 mg/dL       Radiology:(past 2 days)  Cta Head W Wo Contrast    Result Date: 11/8/2017  EXAMINATION: CTA OF THE HEAD WITHOUT AND WITH CONTRAST  11/8/2017 6:05 pm TECHNIQUE: CTA of the head/brain was performed without and with the administration of intravenous contrast. Multiplanar reformatted images are provided for review. MIP images are provided for review. Dose modulation, iterative reconstruction, and/or weight based adjustment of the mA/kV was utilized to reduce the radiation dose to as low as reasonably achievable. COMPARISON: None HISTORY: ORDERING SYSTEM PROVIDED HISTORY: acute cva FINDINGS: CT HEAD: BRAIN/VENTRICLES:  No acute intracranial hemorrhage or extraaxial fluid collection. Grey-white differentiation is maintained. No evidence of mass, mass effect or midline shift. No evidence of hydrocephalus. ORBITS: The visualized portion of the orbits demonstrate no acute abnormality. SINUSES:  The visualized paranasal sinuses and mastoid air cells demonstrate no acute abnormality. SOFT TISSUES/SKULL: No acute abnormality of the visualized skull or soft tissues. CTA HEAD: ANTERIOR CIRCULATION: There is moderate plaque causing moderate stenosis in the cavernous segments of the internal carotid arteries bilaterally. The anterior and middle cerebral arteries appear normal.  The anterior communicating artery is not well visualized. POSTERIOR CIRCULATION: There appears to be severe stenosis in the distal V4 segments of the vertebral arteries bilaterally due to mixed plaque. There is a posterior communicating artery present on the right but not the left.  ANEURYSM: No intracranial aneurysm is seen. 1. No acute intracranial abnormality. 2. Severe stenosis distal V4 segments in the vertebral arteries bilaterally due to mixed plaque. RECOMMENDATIONS: Sensitivity is limited without 3D reformats. An addendum will be performed when these are available. Xr Chest Standard (2 Vw)    Result Date: 10/25/2017  EXAMINATION: TWO VIEWS OF THE CHEST 10/25/2017 12:37 pm COMPARISON: August 28, 2013 HISTORY: ORDERING SYSTEM PROVIDED HISTORY: PAT TECHNOLOGIST PROVIDED HISTORY: Patient is in pre-admission testing. Reason for exam:->PAT Ordering Physician Provided Reason for Exam: pre op Acuity: Unknown Type of Exam: Unknown Preoperative clearance for hip surgery in a patient with multiple comorbidities to include diabetes, hypertension and hyperlipidemia. FINDINGS: The heart is not enlarged. No significant pulmonary edema. Mild elevation of the right hemidiaphragm is unchanged. No focal lung consolidation or infiltrate. No pleural effusion or pneumothorax. Right upper quadrant surgical clips indicate prior cholecystectomy. Stable exam without acute cardiopulmonary process. Xr Pelvis (1-2 Views)    Result Date: 11/7/2017  EXAMINATION: 2 VIEWS OF THE LEFT HIP; SINGLE VIEW OF THE PELVIS 11/7/2017 11:46 am COMPARISON: Intraoperative images from earlier today HISTORY: ORDERING SYSTEM PROVIDED HISTORY: left CHRISTIANO TECHNOLOGIST PROVIDED HISTORY: Of operative side while in recovery room. Reason for exam:->left CHRISTIANO Ordering Physician Provided Reason for Exam: post op total Acuity: Unknown Type of Exam: Unknown FINDINGS: Postop changes from left total hip arthroplasty are present. Prosthetic components are aligned. There is no evidence of periprosthetic fracture. Remainder of the bony pelvis appears intact. Mild degenerative changes are noted in the right hip, partially imaged. There is degenerative change in the lower lumbar spine, partially imaged. Small phleboliths are noted.  Vascular Detail Level: Simple Additional Notes: Patient was recommended to use Keto cream on area as well No cervical or superior mediastinal lymphadenopathy. The visualized portion of the larynx and pharynx appear unremarkable. The parotid, submandibular and thyroid glands demonstrate no acute abnormality. BONES: There is moderate levoconvex scoliosis. There are laminectomies at C3 and C4. Moderate spondylosis and multilevel degenerative disc disease. Moderate to severe stenosis origin of the right vertebral artery and distal V4 segments bilaterally. RECOMMENDATIONS: Sensitivity is limited without curved multiplanar reformats. An addendum will be performed when these are available. Fluoro For Surgical Procedures    Result Date: 11/7/2017  Radiology exam is complete. No Radiologist dictation. Please follow up with ordering provider.        Neuro exam:  A&Ox4, visual fields intact  CN II-XII intact  Motor: 5/5 throughout  Sensory: intact to pain, pinprick and proprioception x4 extremities    Assessment & Plan:  NEUROLOGIC:  - Obtain MRI brain to assess acuteness of stroke  - Neuro-endovascular Dr. Yared Sabillon apparently spoke with Dr. Raul Cervantes and he plans to perform diagnostic angiography today (11/9)  - will hold Eliquis as patient had recent procedure and CT head shows possible tiny petechial hemmorhage    CARDIOVASCULAR:  - home antihypertensives restarted  - Prn antihypertensives for SBP goal 110-180    PULMONARY:  - saturating well on 2L NC    RENAL/FLUID/ELECTROLYTE:  - 125 ml/hr NS  - will check AM labs, replace lytes prn  - Strict I/O's    GI/NUTRITION:  NUTRITION:  Diet NPO, After Midnight Exceptions are: Sips with Meds    ENDOCRINE:  PROBLEMS:  - hyperglycemia  PLAN:   - monitor blood glucose  - insulin therapy -  start  - Medium dose sliding scale    OTHER:  - Ortho to follow for patients L hip replacement, POD#2    PROPHYLAXIS:   Stress ulcer: H2 blocker   VTE: SCDs    DISPOSITION:   Continue ICU          Polo Finely, DO  Emergency Medicine Resident  [facility]
